# Patient Record
Sex: FEMALE | Race: BLACK OR AFRICAN AMERICAN | NOT HISPANIC OR LATINO
[De-identification: names, ages, dates, MRNs, and addresses within clinical notes are randomized per-mention and may not be internally consistent; named-entity substitution may affect disease eponyms.]

---

## 2017-03-20 ENCOUNTER — APPOINTMENT (OUTPATIENT)
Dept: CARDIOLOGY | Facility: CLINIC | Age: 52
End: 2017-03-20

## 2017-06-26 ENCOUNTER — APPOINTMENT (OUTPATIENT)
Dept: CARDIOLOGY | Facility: CLINIC | Age: 52
End: 2017-06-26

## 2017-06-27 ENCOUNTER — APPOINTMENT (OUTPATIENT)
Dept: CARDIOLOGY | Facility: CLINIC | Age: 52
End: 2017-06-27

## 2017-09-22 ENCOUNTER — APPOINTMENT (OUTPATIENT)
Dept: CARDIOLOGY | Facility: CLINIC | Age: 52
End: 2017-09-22
Payer: COMMERCIAL

## 2017-09-22 PROCEDURE — 93284 PRGRMG EVAL IMPLANTABLE DFB: CPT

## 2017-11-17 ENCOUNTER — RECORD ABSTRACTING (OUTPATIENT)
Age: 52
End: 2017-11-17

## 2017-11-21 ENCOUNTER — MEDICATION RENEWAL (OUTPATIENT)
Age: 52
End: 2017-11-21

## 2017-12-21 ENCOUNTER — APPOINTMENT (OUTPATIENT)
Dept: CARDIOLOGY | Facility: CLINIC | Age: 52
End: 2017-12-21
Payer: COMMERCIAL

## 2017-12-21 PROCEDURE — 93306 TTE W/DOPPLER COMPLETE: CPT

## 2017-12-21 PROCEDURE — 93880 EXTRACRANIAL BILAT STUDY: CPT

## 2017-12-28 DIAGNOSIS — Z78.9 OTHER SPECIFIED HEALTH STATUS: ICD-10-CM

## 2017-12-28 DIAGNOSIS — Z82.49 FAMILY HISTORY OF ISCHEMIC HEART DISEASE AND OTHER DISEASES OF THE CIRCULATORY SYSTEM: ICD-10-CM

## 2017-12-29 ENCOUNTER — APPOINTMENT (OUTPATIENT)
Dept: CARDIOLOGY | Facility: CLINIC | Age: 52
End: 2017-12-29
Payer: COMMERCIAL

## 2017-12-29 VITALS
HEIGHT: 66.5 IN | SYSTOLIC BLOOD PRESSURE: 130 MMHG | OXYGEN SATURATION: 99 % | HEART RATE: 87 BPM | DIASTOLIC BLOOD PRESSURE: 72 MMHG | BODY MASS INDEX: 29.06 KG/M2 | WEIGHT: 183 LBS

## 2017-12-29 PROCEDURE — 99214 OFFICE O/P EST MOD 30 MIN: CPT

## 2017-12-29 PROCEDURE — 93284 PRGRMG EVAL IMPLANTABLE DFB: CPT

## 2017-12-29 RX ORDER — ASPIRIN ENTERIC COATED TABLETS 81 MG 81 MG/1
81 TABLET, DELAYED RELEASE ORAL DAILY
Refills: 0 | Status: ACTIVE | COMMUNITY

## 2018-01-02 ENCOUNTER — APPOINTMENT (OUTPATIENT)
Dept: ELECTROPHYSIOLOGY | Facility: CLINIC | Age: 53
End: 2018-01-02
Payer: COMMERCIAL

## 2018-01-02 VITALS
OXYGEN SATURATION: 99 % | HEIGHT: 65.6 IN | WEIGHT: 183 LBS | BODY MASS INDEX: 29.76 KG/M2 | DIASTOLIC BLOOD PRESSURE: 60 MMHG | HEART RATE: 82 BPM | RESPIRATION RATE: 16 BRPM | SYSTOLIC BLOOD PRESSURE: 118 MMHG

## 2018-01-02 VITALS
HEIGHT: 66.5 IN | WEIGHT: 183 LBS | RESPIRATION RATE: 16 BRPM | OXYGEN SATURATION: 99 % | BODY MASS INDEX: 29.06 KG/M2 | HEART RATE: 82 BPM

## 2018-01-02 PROCEDURE — 99243 OFF/OP CNSLTJ NEW/EST LOW 30: CPT

## 2018-01-02 PROCEDURE — 93284 PRGRMG EVAL IMPLANTABLE DFB: CPT

## 2018-02-27 ENCOUNTER — MEDICATION RENEWAL (OUTPATIENT)
Age: 53
End: 2018-02-27

## 2018-03-02 ENCOUNTER — MEDICATION RENEWAL (OUTPATIENT)
Age: 53
End: 2018-03-02

## 2018-03-30 ENCOUNTER — APPOINTMENT (OUTPATIENT)
Dept: CARDIOLOGY | Facility: CLINIC | Age: 53
End: 2018-03-30

## 2018-04-02 ENCOUNTER — APPOINTMENT (OUTPATIENT)
Dept: ELECTROPHYSIOLOGY | Facility: CLINIC | Age: 53
End: 2018-04-02
Payer: COMMERCIAL

## 2018-04-02 PROCEDURE — 93296 REM INTERROG EVL PM/IDS: CPT

## 2018-04-02 PROCEDURE — 93295 DEV INTERROG REMOTE 1/2/MLT: CPT

## 2018-04-02 PROCEDURE — 93297 REM INTERROG DEV EVAL ICPMS: CPT

## 2018-05-18 ENCOUNTER — RECORD ABSTRACTING (OUTPATIENT)
Age: 53
End: 2018-05-18

## 2018-06-05 ENCOUNTER — APPOINTMENT (OUTPATIENT)
Dept: CARDIOLOGY | Facility: CLINIC | Age: 53
End: 2018-06-05
Payer: COMMERCIAL

## 2018-06-05 PROCEDURE — 93284 PRGRMG EVAL IMPLANTABLE DFB: CPT

## 2018-06-08 ENCOUNTER — APPOINTMENT (OUTPATIENT)
Dept: CARDIOLOGY | Facility: CLINIC | Age: 53
End: 2018-06-08
Payer: COMMERCIAL

## 2018-06-08 VITALS
BODY MASS INDEX: 29.99 KG/M2 | SYSTOLIC BLOOD PRESSURE: 140 MMHG | HEIGHT: 65 IN | HEART RATE: 71 BPM | WEIGHT: 180 LBS | DIASTOLIC BLOOD PRESSURE: 70 MMHG | OXYGEN SATURATION: 100 %

## 2018-06-08 PROCEDURE — 99214 OFFICE O/P EST MOD 30 MIN: CPT

## 2018-07-02 ENCOUNTER — APPOINTMENT (OUTPATIENT)
Dept: ELECTROPHYSIOLOGY | Facility: CLINIC | Age: 53
End: 2018-07-02
Payer: COMMERCIAL

## 2018-07-02 PROCEDURE — 93297 REM INTERROG DEV EVAL ICPMS: CPT

## 2018-07-02 PROCEDURE — 93295 DEV INTERROG REMOTE 1/2/MLT: CPT

## 2018-07-02 PROCEDURE — 93296 REM INTERROG EVL PM/IDS: CPT

## 2018-07-22 PROBLEM — Z78.9 ALCOHOL USE: Status: ACTIVE | Noted: 2017-12-28

## 2018-09-17 ENCOUNTER — APPOINTMENT (OUTPATIENT)
Dept: CARDIOLOGY | Facility: CLINIC | Age: 53
End: 2018-09-17
Payer: COMMERCIAL

## 2018-09-17 PROCEDURE — 93284 PRGRMG EVAL IMPLANTABLE DFB: CPT

## 2018-10-08 ENCOUNTER — APPOINTMENT (OUTPATIENT)
Dept: ELECTROPHYSIOLOGY | Facility: CLINIC | Age: 53
End: 2018-10-08
Payer: COMMERCIAL

## 2018-10-08 PROCEDURE — 93295 DEV INTERROG REMOTE 1/2/MLT: CPT

## 2018-10-08 PROCEDURE — 93296 REM INTERROG EVL PM/IDS: CPT

## 2018-12-14 ENCOUNTER — APPOINTMENT (OUTPATIENT)
Dept: CARDIOLOGY | Facility: CLINIC | Age: 53
End: 2018-12-14
Payer: COMMERCIAL

## 2018-12-14 ENCOUNTER — APPOINTMENT (OUTPATIENT)
Dept: CARDIOLOGY | Facility: CLINIC | Age: 53
End: 2018-12-14

## 2018-12-14 PROCEDURE — 93306 TTE W/DOPPLER COMPLETE: CPT

## 2018-12-14 PROCEDURE — 93880 EXTRACRANIAL BILAT STUDY: CPT

## 2018-12-14 PROCEDURE — 93284 PRGRMG EVAL IMPLANTABLE DFB: CPT

## 2018-12-14 NOTE — PROCEDURE
[CRT-D] : Cardiac resynchronization therapy defibrillator [DDD] : DDD [Voltage: ___ volts] : Voltage was [unfilled] volts [Threshold Testing Performed] : Threshold testing was performed [Lead Imp:  ___ohms] : lead impedance was [unfilled] ohms [Sensing Amplitude ___mv] : sensing amplitude was [unfilled] mv [___V @] : [unfilled] V [___ ms] : [unfilled] ms [de-identified] : [employer] [de-identified] : Martinez Saeed 3365-40Q CRT-D [de-identified] : [id number] [de-identified] : 12/2015 [de-identified] :  [de-identified] : Time 2.6 years [de-identified] : AP 10%,  95%\par \par Settings:\par Zones Vfib Rate 200 Therapy ATP x 1, 36J, 40J x 5\par \par Settings\par Ventricle RV Sensing Auto, Amplitude 3.0V, Duration 0.5ms, LV Ampltidue 3.0V, 0.5mS.\par Atrium Sensing Auto, Amplitude 2.5V, Duration 0.5ms\par \par AICD check in 3 months\par AICD is NOT part of battery advisory \par 0 Episodes

## 2019-01-08 ENCOUNTER — APPOINTMENT (OUTPATIENT)
Dept: ELECTROPHYSIOLOGY | Facility: CLINIC | Age: 54
End: 2019-01-08

## 2019-01-22 ENCOUNTER — RECORD ABSTRACTING (OUTPATIENT)
Age: 54
End: 2019-01-22

## 2019-01-25 ENCOUNTER — APPOINTMENT (OUTPATIENT)
Dept: CARDIOLOGY | Facility: CLINIC | Age: 54
End: 2019-01-25
Payer: COMMERCIAL

## 2019-01-25 VITALS
OXYGEN SATURATION: 99 % | DIASTOLIC BLOOD PRESSURE: 64 MMHG | SYSTOLIC BLOOD PRESSURE: 130 MMHG | WEIGHT: 180 LBS | HEIGHT: 65 IN | BODY MASS INDEX: 29.99 KG/M2 | HEART RATE: 86 BPM

## 2019-01-25 PROCEDURE — 99214 OFFICE O/P EST MOD 30 MIN: CPT

## 2019-01-25 PROCEDURE — 93000 ELECTROCARDIOGRAM COMPLETE: CPT

## 2019-02-22 ENCOUNTER — CLINICAL ADVICE (OUTPATIENT)
Age: 54
End: 2019-02-22

## 2019-02-25 ENCOUNTER — RX RENEWAL (OUTPATIENT)
Age: 54
End: 2019-02-25

## 2019-03-22 ENCOUNTER — APPOINTMENT (OUTPATIENT)
Dept: CARDIOLOGY | Facility: CLINIC | Age: 54
End: 2019-03-22
Payer: COMMERCIAL

## 2019-03-22 PROCEDURE — 93284 PRGRMG EVAL IMPLANTABLE DFB: CPT

## 2019-03-22 NOTE — PROCEDURE
[ICD] : Implantable cardioverter-defibrillator [DDD] : DDD [Voltage: ___ volts] : Voltage was [unfilled] volts [Threshold Testing Performed] : Threshold testing was performed [Lead Imp:  ___ohms] : lead impedance was [unfilled] ohms [Sensing Amplitude ___mv] : sensing amplitude was [unfilled] mv [___V @] : [unfilled] V [___ ms] : [unfilled] ms [de-identified] : [employer] [de-identified] : Martinez Saeed 3365-40Q CRT-D [de-identified] : [id number] [de-identified] : 12/2015 [de-identified] :  [de-identified] : Time 2.4 years [de-identified] : AP 10%,  97%\par \par Settings:\par Zones Vfib Rate 200 Therapy ATP x 1, 36J, 40J x 5\par \par Settings\par Ventricle RV Sensing Auto, Amplitude 3.0V, Duration 0.5ms, LV Ampltidue 2.75V, 0.5ms\par Atrium Sensing Auto, Amplitude 2.5V, Duration 0.5ms\par \par AICD check in 3 months\par AICD is NOT part of battery advisory \par No new recorded events\par \par \par Sincerely,\par \par Madeleine Aviles PA-C\par Supervising MD: Dr. Helena Dixon\par

## 2019-06-25 ENCOUNTER — APPOINTMENT (OUTPATIENT)
Dept: CARDIOLOGY | Facility: CLINIC | Age: 54
End: 2019-06-25

## 2019-06-27 ENCOUNTER — APPOINTMENT (OUTPATIENT)
Dept: CARDIOLOGY | Facility: CLINIC | Age: 54
End: 2019-06-27
Payer: COMMERCIAL

## 2019-06-27 PROCEDURE — 93283 PRGRMG EVAL IMPLANTABLE DFB: CPT

## 2019-06-27 NOTE — PROCEDURE
[ICD] : Implantable cardioverter-defibrillator [DDD] : DDD [Voltage: ___ volts] : Voltage was [unfilled] volts [Threshold Testing Performed] : Threshold testing was performed [Lead Imp:  ___ohms] : lead impedance was [unfilled] ohms [Sensing Amplitude ___mv] : sensing amplitude was [unfilled] mv [___V @] : [unfilled] V [___ ms] : [unfilled] ms [de-identified] : [employer] [de-identified] : 12/2015 [de-identified] : Martinez Saeed 3365-40Q CRT-D [de-identified] : [id number] [de-identified] : Time 2.2 years [de-identified] : AP 5.7%,  96%\par \par Settings:\par Zones Vfib Rate 200 Therapy ATP x 1, 36J, 40J x 5\par \par Settings\par Ventricle RV Sensing Auto, Amplitude 3.0V, Duration 0.5ms, LV Ampltidue 2.75V, 0.5ms\par Atrium Sensing Auto, Amplitude 2.5V, Duration 0.5ms\par \par AICD check in 3 months\par AICD is NOT part of battery advisory \par No new recorded events\par \par \par Sincerely,\par \par Madeleine Aviles PA-C\par Supervising MD: Dr. Helena Dixon\par  [de-identified] :

## 2019-06-28 ENCOUNTER — APPOINTMENT (OUTPATIENT)
Dept: CARDIOLOGY | Facility: CLINIC | Age: 54
End: 2019-06-28
Payer: COMMERCIAL

## 2019-06-28 ENCOUNTER — NON-APPOINTMENT (OUTPATIENT)
Age: 54
End: 2019-06-28

## 2019-06-28 VITALS
BODY MASS INDEX: 31.82 KG/M2 | OXYGEN SATURATION: 99 % | DIASTOLIC BLOOD PRESSURE: 70 MMHG | HEART RATE: 79 BPM | WEIGHT: 191 LBS | HEIGHT: 65 IN | SYSTOLIC BLOOD PRESSURE: 132 MMHG

## 2019-06-28 PROCEDURE — 93000 ELECTROCARDIOGRAM COMPLETE: CPT

## 2019-06-28 PROCEDURE — 99214 OFFICE O/P EST MOD 30 MIN: CPT

## 2019-06-28 NOTE — ASSESSMENT
[FreeTextEntry1] : Cardiomyopathy. Stable. No symptoms or signs of CHF. Patient is physically very active. She walks 3 miles 5 times a week. No symptoms of shortness of breath or chest pains. Her AICD was interrogated. No events. She will continue with medications. Low-sodium diet discussed with patient. X-ray was done in January following echocardiogram. No evidence of pleural effusion. Cardiac followup 6 months unless needed.

## 2019-06-28 NOTE — HISTORY OF PRESENT ILLNESS
[FreeTextEntry1] : The patient is presenting today for cardiac followup visit. The patient is 53 years old female with a history of cardiomyopathy. Patient has a history of diabetes and hypertension. Based on cardiac catheterization in the past no evidence of obstructive CAD. She has reduced ejection fraction. She is status post AICD. She reports no shocks. No significant signs or symptoms of fluid overload.

## 2019-09-30 ENCOUNTER — APPOINTMENT (OUTPATIENT)
Dept: CARDIOLOGY | Facility: CLINIC | Age: 54
End: 2019-09-30

## 2019-10-22 ENCOUNTER — APPOINTMENT (OUTPATIENT)
Dept: CARDIOLOGY | Facility: CLINIC | Age: 54
End: 2019-10-22
Payer: COMMERCIAL

## 2019-10-22 VITALS
BODY MASS INDEX: 27.32 KG/M2 | HEIGHT: 66 IN | DIASTOLIC BLOOD PRESSURE: 70 MMHG | SYSTOLIC BLOOD PRESSURE: 126 MMHG | HEART RATE: 58 BPM | WEIGHT: 170 LBS | OXYGEN SATURATION: 97 %

## 2019-10-22 DIAGNOSIS — Z86.79 PERSONAL HISTORY OF OTHER DISEASES OF THE CIRCULATORY SYSTEM: ICD-10-CM

## 2019-10-22 PROCEDURE — 99215 OFFICE O/P EST HI 40 MIN: CPT

## 2019-10-22 RX ORDER — SPIRONOLACTONE 25 MG/1
25 TABLET ORAL DAILY
Qty: 90 | Refills: 3 | Status: DISCONTINUED | COMMUNITY
End: 2019-10-22

## 2019-10-22 RX ORDER — METFORMIN HYDROCHLORIDE 500 MG/1
500 TABLET, COATED ORAL TWICE DAILY
Refills: 0 | Status: DISCONTINUED | COMMUNITY
End: 2019-10-22

## 2019-10-22 RX ORDER — METOPROLOL SUCCINATE 25 MG/1
25 TABLET, EXTENDED RELEASE ORAL DAILY
Qty: 90 | Refills: 1 | Status: DISCONTINUED | COMMUNITY
End: 2019-10-22

## 2019-10-22 RX ORDER — RAMIPRIL 2.5 MG/1
2.5 CAPSULE ORAL DAILY
Qty: 90 | Refills: 0 | Status: DISCONTINUED | COMMUNITY
End: 2019-10-22

## 2019-10-22 NOTE — REVIEW OF SYSTEMS
[Feeling Fatigued] : feeling fatigued [Loss Of Hearing] : hearing loss [Shortness Of Breath] : shortness of breath [Negative] : Endocrine

## 2019-10-22 NOTE — PHYSICAL EXAM
[Normal Conjunctiva] : the conjunctiva exhibited no abnormalities [Eyelids - No Xanthelasma] : the eyelids demonstrated no xanthelasmas [Normal Jugular Venous A Waves Present] : normal jugular venous A waves present [No Jugular Venous Whpiple A Waves] : no jugular venous whipple A waves [Normal Jugular Venous V Waves Present] : normal jugular venous V waves present [Respiration, Rhythm And Depth] : normal respiratory rhythm and effort [Auscultation Breath Sounds / Voice Sounds] : lungs were clear to auscultation bilaterally [Exaggerated Use Of Accessory Muscles For Inspiration] : no accessory muscle use [Heart Rate And Rhythm] : heart rate and rhythm were normal [Heart Sounds] : normal S1 and S2 [Abdomen Tenderness] : non-tender [Abdomen Soft] : soft [Murmurs] : no murmurs present [] : no hepato-splenomegaly [Abdomen Mass (___ Cm)] : no abdominal mass palpated

## 2019-10-22 NOTE — HISTORY OF PRESENT ILLNESS
[FreeTextEntry1] : The patient is presenting here today for hospital followup visit. Patient is 53 years old female with a history of nonischemic cardiomyopathy, status post AICD, hypertension, diabetes. She had recent admission to [facility] and [facility] with DKA and sepsis secondary to community-acquired pneumonia. She was found possible tricuspid vegetation on transthoracic echo. She was transferred to [facility] where she had AICD extraction and she was put on a life best. She was treated with antibiotics. 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 She was treated with high-dose steroids. VICKEY was planned to be repeated for a tricuspid vegetation.

## 2019-10-22 NOTE — ASSESSMENT
[FreeTextEntry1] : the patient is presenting here today for hospital followup visit. Patient is 53 years old female with a history of nonischemic cardiomyopathy, status post AICD, hypertension, diabetes. She had recent admission to [facility] and [facility] with DKA and sepsis secondary to community-acquired pneumonia. She was found possible tricuspid vegetation on transthoracic echo. She was transferred to [facility] where she had AICD extraction and she was put on a life best. She was treated with antibiotics. 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 She was treated with high-dose steroids. VICKEY was planned to be repeated for a tricuspid vegetation.\par Repeat a VICKEY a week ago did not change in terms of tricuspid valve vegetations. Patient is to follow up with her cardiologist at [facility] for AICD insertion. She is also followed by rheumatology for chemotherapy treatment of  vasculitis.\par I had long discussion with Dr Singh who coordinates treatment with team at [facility] and will follow up with patient and .\par I also had a long discussion with  to reassure that follow ups are arranged.

## 2019-12-13 ENCOUNTER — APPOINTMENT (OUTPATIENT)
Dept: CARDIOLOGY | Facility: CLINIC | Age: 54
End: 2019-12-13

## 2019-12-30 ENCOUNTER — TRANSCRIPTION ENCOUNTER (OUTPATIENT)
Age: 54
End: 2019-12-30

## 2019-12-30 ENCOUNTER — APPOINTMENT (OUTPATIENT)
Dept: OTOLARYNGOLOGY | Facility: CLINIC | Age: 54
End: 2019-12-30
Payer: COMMERCIAL

## 2019-12-30 VITALS — BODY MASS INDEX: 27.16 KG/M2 | HEIGHT: 66 IN | WEIGHT: 169 LBS

## 2019-12-30 PROCEDURE — 92588 EVOKED AUDITORY TST COMPLETE: CPT

## 2019-12-30 PROCEDURE — 92567 TYMPANOMETRY: CPT

## 2019-12-30 PROCEDURE — 99244 OFF/OP CNSLTJ NEW/EST MOD 40: CPT | Mod: 25

## 2019-12-30 PROCEDURE — 92557 COMPREHENSIVE HEARING TEST: CPT

## 2019-12-30 NOTE — HISTORY OF PRESENT ILLNESS
[de-identified] : 54F with bilateral severe-profound hearing loss since August 2019.  Was in a diabetic coma in August, woke up with profound hearing loss right ear, severe-profound left ear since onset.  Hearing loss is constant, nonfluctuating.  Wearing an amplifier with some benefit.  Has had diabetes since age 50.  Has cardiomyopathy (secondary to viral infection?), Had AICD placed 4 years ago, more recently removed due to concern for endocarditis/tricuspid valve vegetation.  Takes aspirin, no other blood thinners.  Prior VICKEY from October 2019 showed an EF of 25-30%.  Treated for vasculitis, on steroids for months, just stopped last week.

## 2019-12-30 NOTE — REVIEW OF SYSTEMS
[Seasonal Allergies] : seasonal allergies [Dizziness] : dizziness [Hearing Loss] : hearing loss [Vertigo] : vertigo [Negative] : Heme/Lymph [FreeTextEntry1] : passing out, fatigue

## 2019-12-30 NOTE — DATA REVIEWED
[de-identified] : I personally reviewed the patient's audiogram, which shows right profound hearing loss, left severe-profound hearing loss with 60% word discrim.\par  [de-identified] : I personally reviewed outside records and they are summarized as follows:  She underwent evaluation by Dr. Lema who recommended evaluation for possible cochlear implant.\par

## 2019-12-30 NOTE — CONSULT LETTER
[FreeTextEntry1] : Dear Arturo,\par \par Thank you very much for your consultation request regarding Hanane Mcintyre.  As you know, she is a very pleasant 54-year-old woman with a history of bilateral severe-profound hearing loss that developed after awakening from a diabetic coma in August 2019.  She also has multiple other medical comorbidities including cardiomyopathy and vasculitis, along with recent removal of an AICD secondary to endocarditis.  She also has had severe imbalance and dizziness since the onset of hearing loss, and currently uses a walker for ambulation.\par \par Her otoscopic exam today is unremarkable.  A hearing test shows a right-sided profound hearing loss with absent speech discrimination, while the left ear has a severe to profound hearing loss with 60% speech discrimination.\par \par I agree that audiometrically Hanane is likely to be a good candidate for a right-sided cochlear implant.  We discussed how a cochlear implant differs from a hearing aid as well as details of the surgery and aural rehabilitation process.  Unfortunately, due to her significant cardiac comorbidities, this poses a significant risk for general anesthesia.  Before considering a cochlear implant for the right ear, I would ask that her cardiologist ensures that she is medically optimized.  In the short term I do believe she could still benefit from a hearing aid in the left ear, and I also recommended that she begin vestibular therapy.  I plan to see her back in early February after her upcoming VICKEY and cardiology reassessment.\par \par Thank you once again for the opportunity to participate in your patient's care, and I will continue to keep you updated as to her progress.\par \par Best regards,\par \par Dorian Watkins MD\par Otology/Neurotology\par Department of Otolaryngology\par [facility] [FreeTextEntry2] : Arturo Lema MD

## 2020-02-13 ENCOUNTER — APPOINTMENT (OUTPATIENT)
Dept: PHARMACY | Facility: CLINIC | Age: 55
End: 2020-02-13

## 2020-02-26 ENCOUNTER — APPOINTMENT (OUTPATIENT)
Dept: OTOLARYNGOLOGY | Facility: CLINIC | Age: 55
End: 2020-02-26

## 2020-02-26 ENCOUNTER — APPOINTMENT (OUTPATIENT)
Dept: OTOLARYNGOLOGY | Facility: CLINIC | Age: 55
End: 2020-02-26
Payer: COMMERCIAL

## 2020-02-26 VITALS — HEIGHT: 66 IN | WEIGHT: 169 LBS | BODY MASS INDEX: 27.16 KG/M2

## 2020-02-26 PROCEDURE — 99214 OFFICE O/P EST MOD 30 MIN: CPT

## 2020-02-26 NOTE — DATA REVIEWED
[de-identified] : I personally reviewed MRI report, which shows no internal auditory canal or retrocochlear pathology bilaterally.\par

## 2020-02-26 NOTE — CONSULT LETTER
[FreeTextEntry1] : Dear Arturo,\par \par Hanane Mcintyre presents for followup for her bilateral sensorineural hearing loss.  Since her last visit, she has undergone both brain MRI which was normal, and also had her pacemaker replaced.  \par \par Her exam today is stable.  I again had a long discussion regarding cochlear implant surgery, which she would like to pursue for the right ear.  For the left ear she plans to use a hearing aid as it is still aidable.  I ordered a temporal bone CT and formal cochlear implant evaluation.  She did consult with her cardiologist regarding ochlear implant surgery, and most likely she will be able to undergo general anesthesia required for this procedure.  I will see her back once more after testing.\par \par Thank you once again for the opportunity to participate in your patient's care, and I will continue to keep you updated as to her progress.\par \par Best regards,\par \par Dorian Watkins MD\par Otology/Neurotology\par Department of Otolaryngology\par [facility] [FreeTextEntry2] : Arturo Lema MD

## 2020-03-10 ENCOUNTER — APPOINTMENT (OUTPATIENT)
Dept: PHARMACY | Facility: CLINIC | Age: 55
End: 2020-03-10
Payer: SELF-PAY

## 2020-03-10 PROCEDURE — V5010C: CUSTOM

## 2020-03-16 ENCOUNTER — APPOINTMENT (OUTPATIENT)
Dept: CT IMAGING | Facility: CLINIC | Age: 55
End: 2020-03-16
Payer: COMMERCIAL

## 2020-03-16 ENCOUNTER — APPOINTMENT (OUTPATIENT)
Dept: CT IMAGING | Facility: CLINIC | Age: 55
End: 2020-03-16

## 2020-03-16 PROCEDURE — 70480 CT ORBIT/EAR/FOSSA W/O DYE: CPT

## 2020-03-25 ENCOUNTER — APPOINTMENT (OUTPATIENT)
Dept: OTOLARYNGOLOGY | Facility: CLINIC | Age: 55
End: 2020-03-25
Payer: COMMERCIAL

## 2020-03-25 VITALS — BODY MASS INDEX: 27.16 KG/M2 | WEIGHT: 169 LBS | HEIGHT: 66 IN

## 2020-03-25 PROCEDURE — 99213 OFFICE O/P EST LOW 20 MIN: CPT

## 2020-03-25 NOTE — CONSULT LETTER
[FreeTextEntry2] : Arturo Lema MD [FreeTextEntry1] : Dear Arturo,\par \par Hanane Fontana presents for followup for her bilateral hearing loss.  Since her last visit, she has undergone temporal bone CT which showed normal anatomy for cochlear implant surgery.  Due to the recent COVID-19 outbreak, her cochlear implant evaluation was temporarily delayed.  We will also need to take into consideration the timing of her immunosuppressive therapy for her vasculitis.  For now I expect that we will be able to move forward with surgery sometime in the next 3 months.\par \par Thank you once again for the opportunity to participate in your patient's care, and I will continue to keep you updated as to her progress.\par \par Best regards,\par \par Dorian Watkins MD\par Otology/Neurotology\par Department of Otolaryngology\par [facility]

## 2020-03-25 NOTE — DATA REVIEWED
[de-identified] : I personally reviewed temporal bone CT images and the report, which shows no evidence of middle ear or mastoid opacification bilaterally.  The ossicular chain appears intact bilaterally.\par

## 2020-03-26 ENCOUNTER — APPOINTMENT (OUTPATIENT)
Dept: PHARMACY | Facility: CLINIC | Age: 55
End: 2020-03-26
Payer: SELF-PAY

## 2020-03-26 PROCEDURE — V5264A: CUSTOM | Mod: LT

## 2020-03-26 PROCEDURE — V5264D: CUSTOM | Mod: LT

## 2020-03-26 PROCEDURE — V5257E: CUSTOM | Mod: LT

## 2020-03-26 PROCEDURE — V5254D: CUSTOM

## 2020-03-30 ENCOUNTER — APPOINTMENT (OUTPATIENT)
Dept: SPEECH THERAPY | Facility: CLINIC | Age: 55
End: 2020-03-30

## 2020-05-28 ENCOUNTER — APPOINTMENT (OUTPATIENT)
Dept: PHARMACY | Facility: CLINIC | Age: 55
End: 2020-05-28
Payer: SELF-PAY

## 2020-05-28 PROCEDURE — V5299A: CUSTOM | Mod: NC

## 2020-06-11 ENCOUNTER — APPOINTMENT (OUTPATIENT)
Dept: CARDIOLOGY | Facility: CLINIC | Age: 55
End: 2020-06-11
Payer: COMMERCIAL

## 2020-06-11 PROCEDURE — 93282 PRGRMG EVAL IMPLANTABLE DFB: CPT

## 2020-06-11 NOTE — PROCEDURE
[No] : not [ICD] : Implantable cardioverter-defibrillator [VVI] : VVI [Lead Imp:  ___ohms] : lead impedance was [unfilled] ohms [Sensing Amplitude ___mv] : sensing amplitude was [unfilled] mv [___V @] : [unfilled] V [___ ms] : [unfilled] ms [de-identified] : Fortify Assura [de-identified] : MANNY [de-identified] : [id number] [de-identified] : 2/13/2020 [de-identified] : 40 [de-identified] : 9 years [de-identified] : \par \par AICD with normal sensing, capture, battery\par Upcoming office visit with Dr. Lopez\par \par Red flag symptoms which would warrant sooner emergent evaluation reviewed with the patient. \par Questions and concerns were addressed and answered. \par \par AICD interrogation in 3 months\par \par Sincerely,\par \par Madeleine Aviles PA-C\par  supervising MD: Dr. Callie Lopez

## 2020-06-25 ENCOUNTER — APPOINTMENT (OUTPATIENT)
Dept: SPEECH THERAPY | Facility: CLINIC | Age: 55
End: 2020-06-25

## 2020-06-25 ENCOUNTER — OUTPATIENT (OUTPATIENT)
Dept: OUTPATIENT SERVICES | Facility: HOSPITAL | Age: 55
LOS: 1 days | Discharge: HOME | End: 2020-06-25

## 2020-06-29 ENCOUNTER — APPOINTMENT (OUTPATIENT)
Dept: OTOLARYNGOLOGY | Facility: CLINIC | Age: 55
End: 2020-06-29
Payer: COMMERCIAL

## 2020-06-29 PROCEDURE — 99441: CPT

## 2020-07-27 DIAGNOSIS — H90.3 SENSORINEURAL HEARING LOSS, BILATERAL: ICD-10-CM

## 2020-08-12 ENCOUNTER — APPOINTMENT (OUTPATIENT)
Dept: CARDIOLOGY | Facility: CLINIC | Age: 55
End: 2020-08-12
Payer: COMMERCIAL

## 2020-08-12 ENCOUNTER — NON-APPOINTMENT (OUTPATIENT)
Age: 55
End: 2020-08-12

## 2020-08-12 VITALS
TEMPERATURE: 97.1 F | BODY MASS INDEX: 25.71 KG/M2 | SYSTOLIC BLOOD PRESSURE: 112 MMHG | WEIGHT: 160 LBS | HEIGHT: 66 IN | OXYGEN SATURATION: 98 % | DIASTOLIC BLOOD PRESSURE: 64 MMHG | HEART RATE: 70 BPM

## 2020-08-12 PROCEDURE — 93000 ELECTROCARDIOGRAM COMPLETE: CPT

## 2020-08-12 PROCEDURE — 99215 OFFICE O/P EST HI 40 MIN: CPT

## 2020-08-12 NOTE — REVIEW OF SYSTEMS
[Feeling Fatigued] : feeling fatigued [Loss Of Hearing] : hearing loss [Shortness Of Breath] : shortness of breath [Chest  Pressure] : no chest pressure [Dyspnea on exertion] : dyspnea during exertion [Lower Ext Edema] : no extremity edema [Chest Pain] : no chest pain [Leg Claudication] : no intermittent leg claudication [Palpitations] : no palpitations [see HPI] : see HPI [Tremor] : no tremor was seen [Dizziness] : dizziness [Numbness (Hypesthesia)] : no numbness [Convulsions] : no convulsions [Tingling (Paresthesia)] : no tingling [Negative] : Heme/Lymph

## 2020-08-12 NOTE — REASON FOR VISIT
[Cardiomyopathy] : cardiomyopathy [Follow-Up - Clinic] : a clinic follow-up of [Heart Failure] : congestive heart failure [Hyperlipidemia] : hyperlipidemia [Family Member] : family member [FreeTextEntry1] : 54-year-old female comes in for follow-up consultation in the office.  Since last seen she has been doing fairly well except complain of fatigue intermittent.  Mild postural dizziness.  Exertional dyspnea without PND orthopnea pedal edema.  No chest pain.  No significant palpitation.  No AICD discharge.\par Stable weight and diet.\par Activity level is limited.\par Compliant with medication noted.\par No claudication pain.\par

## 2020-08-12 NOTE — ASSESSMENT
[FreeTextEntry1] : Reviewed on August 12, 2020.\par Labs from 5/18/2020.  Creatinine 1.15 sodium 144 potassium 4.3 LFT normal.  Serum ferritin level 595.  Hemoglobin 11.1.

## 2020-08-12 NOTE — DISCUSSION/SUMMARY
[FreeTextEntry1] : 54-year-old female with above medical history and active medical problems as noted below.\par #1.  Chronic systolic heart failure because of nonischemic nondilated cardiomyopathy at present class II heart failure symptoms without signs of volume overload.  Not on ACE inhibitor/angiotensin receptor blocker or spironolactone.\par Continue metoprolol to be taken at nighttime\par Decrease furosemide to 20 mg every other day\par Decrease potassium to half a tablet daily every other day\par After reviewing risk benefits alternative of use of Entresto 24/26 twice daily has been started.  Any significant side effects they will contact me.\par She will have BMP checked weekly for next 3 to 4 weeks.\par If unable to tolerate we will consider changing it to lower dose of ACE inhibitor or angiotensin receptor blocker.\par As needed we can discontinue the use of furosemide and potassium as needed.\par Low-salt diet.  Close follow-up on weight strongly recommended.\par 2.  Labs which will include BNP level\par 3.  AICD [employer] single lead follow on a regular basis\par 4.  History of tricuspid valve vegetation.  Follow-up echocardiogram for cardiomyopathy tricuspid valve vegetation.  Last C-reactive protein was 3.6 and sed rate was 6 in May 2020.\par She has no fever or chills no element of distal embolization at present.\par 5.  Mild postural dizziness.  Probably in relation to diuretic use.  Possible playing a role would be auditory dysfunction.\par Increase fluid intake.  Decreasing dose of furosemide.  And other changes in medication\par 6.  Hyperlipidemia.  Continue present regimen of medications.  Follow-up labs\par 7.  Essential hypertension at present stable no significant postural shifts.  We will continue to follow.\par \par Counseling regarding low saturated fat, salt and carbohydrate intake was reviewed. Active lifestyle and regular. Exercise along with weight management is advised.\par All the above were at length reviewed. Answered all the questions. Thank you very much for this kind referral. Please do not hesitate to give me a call for any question.\par Part of this transcription was done with voice recognition software and phonetically similar errors are common. I apologize for that. Please do not hesitate to call for any questions due to above.\par

## 2020-08-12 NOTE — PHYSICAL EXAM
[Eyelids - No Xanthelasma] : the eyelids demonstrated no xanthelasmas [Normal Conjunctiva] : the conjunctiva exhibited no abnormalities [Normal Jugular Venous A Waves Present] : normal jugular venous A waves present [Normal Jugular Venous V Waves Present] : normal jugular venous V waves present [No Jugular Venous Whipple A Waves] : no jugular venous whipple A waves [Auscultation Breath Sounds / Voice Sounds] : lungs were clear to auscultation bilaterally [Exaggerated Use Of Accessory Muscles For Inspiration] : no accessory muscle use [Respiration, Rhythm And Depth] : normal respiratory rhythm and effort [Heart Rate And Rhythm] : heart rate and rhythm were normal [Heart Sounds] : normal S1 and S2 [Abdomen Soft] : soft [Abdomen Tenderness] : non-tender [Murmurs] : no murmurs present [Abdomen Mass (___ Cm)] : no abdominal mass palpated [Abnormal Walk] : normal gait [Gait - Sufficient For Exercise Testing] : the gait was sufficient for exercise testing [Nail Clubbing] : no clubbing of the fingernails [Petechial Hemorrhages (___cm)] : no petechial hemorrhages [Cyanosis, Localized] : no localized cyanosis [Skin Color & Pigmentation] : normal skin color and pigmentation [] : no rash [No Venous Stasis] : no venous stasis [Skin Lesions] : no skin lesions [No Skin Ulcers] : no skin ulcer [No Xanthoma] : no  xanthoma was observed [Oriented To Time, Place, And Person] : oriented to person, place, and time [Affect] : the affect was normal [No Anxiety] : not feeling anxious [Mood] : the mood was normal

## 2020-08-12 NOTE — HISTORY OF PRESENT ILLNESS
[FreeTextEntry1] : 1.  Nonischemic non-dilated cardiomyopathy.\par 2.  [employer] single lead AICD for primary prevention\par 3.  Essential hypertension.  CHF.  Mild CKD.  Non-smoker\par 4.  Type 2 diabetes mellitus.\par 5.  Hyperlipidemia on statin therapy\par 6.  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  History of vasculitis and acute hearing loss\par 8.  Mitral and tricuspid regurgitation.

## 2020-08-18 RX ORDER — FUROSEMIDE 40 MG/1
40 TABLET ORAL EVERY OTHER DAY
Refills: 0 | Status: DISCONTINUED | COMMUNITY
End: 2020-08-18

## 2020-09-09 ENCOUNTER — APPOINTMENT (OUTPATIENT)
Dept: CARDIOLOGY | Facility: CLINIC | Age: 55
End: 2020-09-09
Payer: COMMERCIAL

## 2020-09-09 ENCOUNTER — NON-APPOINTMENT (OUTPATIENT)
Age: 55
End: 2020-09-09

## 2020-09-09 VITALS
WEIGHT: 154 LBS | DIASTOLIC BLOOD PRESSURE: 60 MMHG | OXYGEN SATURATION: 98 % | HEIGHT: 66 IN | BODY MASS INDEX: 24.75 KG/M2 | HEART RATE: 76 BPM | SYSTOLIC BLOOD PRESSURE: 104 MMHG

## 2020-09-09 DIAGNOSIS — I33.0 ACUTE AND SUBACUTE INFECTIVE ENDOCARDITIS: ICD-10-CM

## 2020-09-09 DIAGNOSIS — Z00.00 ENCOUNTER FOR GENERAL ADULT MEDICAL EXAMINATION W/OUT ABNORMAL FINDINGS: ICD-10-CM

## 2020-09-09 PROCEDURE — 93000 ELECTROCARDIOGRAM COMPLETE: CPT | Mod: 59

## 2020-09-09 PROCEDURE — 99214 OFFICE O/P EST MOD 30 MIN: CPT

## 2020-09-09 PROCEDURE — 93282 PRGRMG EVAL IMPLANTABLE DFB: CPT

## 2020-09-09 PROCEDURE — 93306 TTE W/DOPPLER COMPLETE: CPT

## 2020-09-09 NOTE — PROCEDURE
[See Device Printout] : See device printout [No] : not [VVI] : VVI [ICD] : Implantable cardioverter-defibrillator [Lead Imp:  ___ohms] : lead impedance was [unfilled] ohms [Sensing Amplitude ___mv] : sensing amplitude was [unfilled] mv [___ ms] : [unfilled] ms [None] : none [___V @] : [unfilled] V [Counters Reset] : the counters were reset [Pace ___ %] : Pace [unfilled]% [de-identified] : MANNY [de-identified] : [id number] [de-identified] : Fortify Assura [de-identified] : 40 [de-identified] : 8.8 years [de-identified] : 2/13/2020 [de-identified] : \par \par AICD with normal sensing, capture, battery\par No events\par Office visit today\par \par Sincerely,\par \par GERBER Chacko\par Supervising MD: Dr. Callie Lopez

## 2020-09-18 ENCOUNTER — OUTPATIENT (OUTPATIENT)
Dept: OUTPATIENT SERVICES | Facility: HOSPITAL | Age: 55
LOS: 1 days | End: 2020-09-18

## 2020-09-18 VITALS
RESPIRATION RATE: 16 BRPM | OXYGEN SATURATION: 98 % | TEMPERATURE: 98 F | HEART RATE: 74 BPM | SYSTOLIC BLOOD PRESSURE: 112 MMHG | WEIGHT: 156.09 LBS | HEIGHT: 66 IN | DIASTOLIC BLOOD PRESSURE: 64 MMHG

## 2020-09-18 DIAGNOSIS — H90.3 SENSORINEURAL HEARING LOSS, BILATERAL: ICD-10-CM

## 2020-09-18 DIAGNOSIS — E11.9 TYPE 2 DIABETES MELLITUS WITHOUT COMPLICATIONS: ICD-10-CM

## 2020-09-18 DIAGNOSIS — Z95.810 PRESENCE OF AUTOMATIC (IMPLANTABLE) CARDIAC DEFIBRILLATOR: ICD-10-CM

## 2020-09-18 DIAGNOSIS — H90.5 UNSPECIFIED SENSORINEURAL HEARING LOSS: ICD-10-CM

## 2020-09-18 DIAGNOSIS — Z95.810 PRESENCE OF AUTOMATIC (IMPLANTABLE) CARDIAC DEFIBRILLATOR: Chronic | ICD-10-CM

## 2020-09-18 DIAGNOSIS — Z86.79 PERSONAL HISTORY OF OTHER DISEASES OF THE CIRCULATORY SYSTEM: ICD-10-CM

## 2020-09-18 DIAGNOSIS — Z98.51 TUBAL LIGATION STATUS: Chronic | ICD-10-CM

## 2020-09-18 DIAGNOSIS — I10 ESSENTIAL (PRIMARY) HYPERTENSION: ICD-10-CM

## 2020-09-18 LAB
ANION GAP SERPL CALC-SCNC: 11 MMO/L — SIGNIFICANT CHANGE UP (ref 7–14)
BUN SERPL-MCNC: 21 MG/DL — SIGNIFICANT CHANGE UP (ref 7–23)
CALCIUM SERPL-MCNC: 9.8 MG/DL — SIGNIFICANT CHANGE UP (ref 8.4–10.5)
CHLORIDE SERPL-SCNC: 106 MMOL/L — SIGNIFICANT CHANGE UP (ref 98–107)
CO2 SERPL-SCNC: 25 MMOL/L — SIGNIFICANT CHANGE UP (ref 22–31)
CREAT SERPL-MCNC: 0.83 MG/DL — SIGNIFICANT CHANGE UP (ref 0.5–1.3)
GLUCOSE SERPL-MCNC: 102 MG/DL — HIGH (ref 70–99)
HBA1C BLD-MCNC: 6 % — HIGH (ref 4–5.6)
HCG UR-SCNC: NEGATIVE — SIGNIFICANT CHANGE UP
HCT VFR BLD CALC: 37.8 % — SIGNIFICANT CHANGE UP (ref 34.5–45)
HGB BLD-MCNC: 11.2 G/DL — LOW (ref 11.5–15.5)
MCHC RBC-ENTMCNC: 21.5 PG — LOW (ref 27–34)
MCHC RBC-ENTMCNC: 29.6 % — LOW (ref 32–36)
MCV RBC AUTO: 72.4 FL — LOW (ref 80–100)
NRBC # FLD: 0 K/UL — SIGNIFICANT CHANGE UP (ref 0–0)
PLATELET # BLD AUTO: 224 K/UL — SIGNIFICANT CHANGE UP (ref 150–400)
PMV BLD: 10.9 FL — SIGNIFICANT CHANGE UP (ref 7–13)
POTASSIUM SERPL-MCNC: 4.7 MMOL/L — SIGNIFICANT CHANGE UP (ref 3.5–5.3)
POTASSIUM SERPL-SCNC: 4.7 MMOL/L — SIGNIFICANT CHANGE UP (ref 3.5–5.3)
RBC # BLD: 5.22 M/UL — HIGH (ref 3.8–5.2)
RBC # FLD: 16.8 % — HIGH (ref 10.3–14.5)
SODIUM SERPL-SCNC: 142 MMOL/L — SIGNIFICANT CHANGE UP (ref 135–145)
SP GR UR: 1.03 — SIGNIFICANT CHANGE UP (ref 1–1.04)
WBC # BLD: 4.58 K/UL — SIGNIFICANT CHANGE UP (ref 3.8–10.5)
WBC # FLD AUTO: 4.58 K/UL — SIGNIFICANT CHANGE UP (ref 3.8–10.5)

## 2020-09-18 RX ORDER — SODIUM CHLORIDE 9 MG/ML
1000 INJECTION, SOLUTION INTRAVENOUS
Refills: 0 | Status: DISCONTINUED | OUTPATIENT
Start: 2020-10-12 | End: 2020-10-26

## 2020-09-18 RX ORDER — METOPROLOL TARTRATE 50 MG
1 TABLET ORAL
Qty: 0 | Refills: 0 | DISCHARGE

## 2020-09-18 RX ORDER — SODIUM CHLORIDE 9 MG/ML
3 INJECTION INTRAMUSCULAR; INTRAVENOUS; SUBCUTANEOUS ONCE
Refills: 0 | Status: DISCONTINUED | OUTPATIENT
Start: 2020-10-12 | End: 2020-10-26

## 2020-09-18 NOTE — H&P PST ADULT - NS MD HP INPLANTS MED DEV
Hearing aid/Automatic Implantable Cardioverter Defibrillator/[employer] AICD Model [id number] Serial # [id number] Implant date 2/2020

## 2020-09-18 NOTE — H&P PST ADULT - NSANTHOSAYNRD_GEN_A_CORE
No. SMILEY screening performed.  STOP BANG Legend: 0-2 = LOW Risk; 3-4 = INTERMEDIATE Risk; 5-8 = HIGH Risk

## 2020-09-18 NOTE — H&P PST ADULT - NSICDXPASTMEDICALHX_GEN_ALL_CORE_FT
PAST MEDICAL HISTORY:  Acute hearing loss     AICD (automatic cardioverter/defibrillator) present     Cardiomyopathy nonischemic non-dilated cardiomyopathy    Chronic CHF     Chronic kidney disease (CKD) mild    Endocarditis of tricuspid valve     Frozen shoulder right    H/O bacterial endocarditis     History of coma 8/2019 for possible infection of AICD. s/p removal of device    History of vasculitis     HLD (hyperlipidemia)     HTN (hypertension)     Mitral regurgitation     Systolic CHF, chronic     Tricuspid regurgitation     Type 2 diabetes mellitus on insulin

## 2020-09-18 NOTE — H&P PST ADULT - ENDOCRINE COMMENTS
type 2 DM on insulin. avg fasting BS 89-90mg/dl. on insulin type 2 DM on insulin. avg fasting BS 89-90mg/dl. on lantus at bedtime

## 2020-09-18 NOTE — H&P PST ADULT - HEARING LOSS
R/right ear hearing loss. pt wears left ear hearing aid R/hearing loss, bilateral. R<L. pt wears left ear hearing aid

## 2020-09-18 NOTE — H&P PST ADULT - NEGATIVE ENMT SYMPTOMS
no gum bleeding/no nose bleeds/no dry mouth/no ear pain/pt reports "off" balance/no vertigo/no dysphagia

## 2020-09-18 NOTE — H&P PST ADULT - NSICDXPROBLEM_GEN_ALL_CORE_FT
PROBLEM DIAGNOSES  Problem: Sensorineural hearing loss  Assessment and Plan: preop for right ear cochlear implant, facial nerve monitoring on 9/22/20  preop instructions given, pt verbalized understanding   GI prophylaxis provided   pt scheduled for COVID testing on 9/19/20    Problem: History of CHF (congestive heart failure)  Assessment and Plan: pt will take Entresto day of surgery with sips of water   Cardiac clearance, ECHO and EKG on chart     Problem: Type 2 diabetes mellitus  Assessment and Plan: pt will hold tradjenta day of surgery.   pt will reduce Lantus by 20% night before surgery. Only 12 units to be given   accu check to be assessed on admission     Problem: AICD (automatic cardioverter/defibrillator) present  Assessment and Plan: pt with [employer] AICD Model # [id number] Serial [id number] implant date 2/13/20  Discussed case with [facility] anesthesiologist Dr. Martínez- case has to be moved to the main OR due to pt's AICD status. Dr. Watkins's surgical coordinator Margret made aware. pt and daughter also informed of change   OR booking notified of AICD status via fax

## 2020-09-18 NOTE — H&P PST ADULT - NSICDXPASTSURGICALHX_GEN_ALL_CORE_FT
PAST SURGICAL HISTORY:  H/O tubal ligation     S/P implantation of automatic cardioverter/defibrillator (AICD) [employer] AICD insertion 2015. s/p removal 8/2019. AICD replaced 2/2020

## 2020-09-19 ENCOUNTER — APPOINTMENT (OUTPATIENT)
Dept: DISASTER EMERGENCY | Facility: CLINIC | Age: 55
End: 2020-09-19

## 2020-09-20 LAB — SARS-COV-2 N GENE NPH QL NAA+PROBE: NOT DETECTED

## 2020-09-25 NOTE — REVIEW OF SYSTEMS
[Loss Of Hearing] : hearing loss [see HPI] : see HPI [Dizziness] : dizziness [Negative] : Psychiatric [Dyspnea on exertion] : not dyspnea during exertion [Chest  Pressure] : no chest pressure [Chest Pain] : no chest pain [Lower Ext Edema] : no extremity edema [Leg Claudication] : no intermittent leg claudication [Palpitations] : no palpitations [Tremor] : no tremor was seen [Numbness (Hypesthesia)] : no numbness [Convulsions] : no convulsions [Tingling (Paresthesia)] : no tingling

## 2020-09-25 NOTE — PHYSICAL EXAM
[Normal Appearance] : normal appearance [General Appearance - Well Developed] : well developed [Well Groomed] : well groomed [General Appearance - Well Nourished] : well nourished [No Deformities] : no deformities [General Appearance - In No Acute Distress] : no acute distress [Exaggerated Use Of Accessory Muscles For Inspiration] : no accessory muscle use [Respiration, Rhythm And Depth] : normal respiratory rhythm and effort [Heart Rate And Rhythm] : heart rate and rhythm were normal [Heart Sounds] : normal S1 and S2 [Auscultation Breath Sounds / Voice Sounds] : lungs were clear to auscultation bilaterally [Abdomen Tenderness] : non-tender [Murmurs] : no murmurs present [Abdomen Soft] : soft [Edema] : no peripheral edema present [Abdomen Mass (___ Cm)] : no abdominal mass palpated [Nail Clubbing] : no clubbing of the fingernails [Cyanosis, Localized] : no localized cyanosis [Skin Color & Pigmentation] : normal skin color and pigmentation [Petechial Hemorrhages (___cm)] : no petechial hemorrhages [] : no rash [No Venous Stasis] : no venous stasis [Skin Lesions] : no skin lesions [No Skin Ulcers] : no skin ulcer [No Xanthoma] : no  xanthoma was observed [Oriented To Time, Place, And Person] : oriented to person, place, and time [Affect] : the affect was normal [Mood] : the mood was normal [No Anxiety] : not feeling anxious [FreeTextEntry1] : slow gait with cane

## 2020-09-25 NOTE — ADDENDUM
[FreeTextEntry1] : \par Addendum 9/25/20:\par Pt's cochlear implant procedure was changed to 10/12/20. Pt remains optimized from cardiovascular standpoint to proceed as planned. Additionally would recommend *SBE ppx* given history of IE. Pt has allergy to ceftriaxone so given potential for cross sensitivity to pcn will rx clinda 600mg 1hr prior to procedure as per current guidelines. Pt and  notified and verbalize understanding of instructions. \par

## 2020-09-25 NOTE — HISTORY OF PRESENT ILLNESS
[Preoperative Visit] : for a medical evaluation prior to surgery [Scheduled Procedure ___] : a [unfilled] [Date of Surgery ___] : on [unfilled] [Surgeon Name ___] : surgeon: [unfilled] [Stable] : Stable [FreeTextEntry1] : \par Hanane is a pleasant 53 y/o female who presents today for device interrogation, echocardiogram, and requesting preop cardiac clearance for cochlear implant. \par \par Detailed history includes:\par 1.  Nonischemic non-dilated cardiomyopathy.\par 2.  [employer] single lead AICD for primary prevention\par 3.  Essential hypertension.  CHF.  Mild CKD.  Non-smoker\par 4.  Type 2 diabetes mellitus.\par 5.  Hyperlipidemia on statin therapy\par 6.  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  History of vasculitis and acute hearing loss\par 8.  Mitral and tricuspid regurgitation.\par \par There has been no recent illness or hospitalization. Recently started on low dose entresto and taken off lasix. She has remained stable from cardiac standpoint without evidence of volume overload. Renal function and electrolytes are stable per recent labs. She was dizzy at initiation of txt which resolved after a few weeks. Reports stable functional status. Denies exertional chest pain or discomfort. Denies unusual shortness of breath, orthopnea, weight gain, or LE edema. Denies palpitations or syncope. \par \par EKG today shows NSR with chronic LBBB\par Echo today shows LVEF 20-25%, global LV dysfunction. Borderline PH. Mild VHD. Overall no sig change. \par \par Labs 9/1/20 k 4.4, creat 0.83, BNP 91

## 2020-09-25 NOTE — DISCUSSION/SUMMARY
[FreeTextEntry1] : ALEKSANDR MARTEL is a 54 year old F who presents today Sep 09, 2020 with the above history and the following active issues: \par \par #1.  Chronic systolic heart failure because of nonischemic nondilated cardiomyopathy at present class II heart failure symptoms without signs of volume overload. BNP wnl. LVEF remains 20-25% with ICD. \par Continue metoprolol to be taken at nighttime.\par Had dizziness on lasix and Entresto 24/26 twice daily so lasix was discontinued. \par Would recommend PRN use for any fluid OL.\par Renal function and electrolytes are stable per recent labs. BP stable. \par Continue entresto. Call for any further dizziness. \par If unable to tolerate we will consider changing it to lower dose of ACE inhibitor or angiotensin receptor blocker. Unable to add spironolactone at this time d/t low BP/dizziness. \par Low-salt diet.  Close follow-up on weight strongly recommended.\par Pt and  agreeable for consultation with HF specialist, will refer to Dr. Monroy in [place]. \par \par 2.   AICD [employer] single lead shows no events, stable battery and lead measurements. Continue to follow q 3 mo. \par \par 3.  History of tricuspid valve vegetation. Echo shows stable findings. Last C-reactive protein was 3.6 and sed rate was 6 in May 2020. She has no fever or chills no element of distal embolization at present. Continue surveillance. \par \par 4.  Hyperlipidemia.  Continue present regimen of medications.  Follow-up lipid panel in the future. \par \par 5.  Essential hypertension at present stable no significant postural shifts.  We will continue to follow.\par \par 6. Preop cardiac clearance, cochlear implant - \par At present chronic cardiac conditions noted above are stable. \par Baseline functional status is acceptable.    \par The clinical benefit of the proposed procedure outweighs the associated cardiovascular risk.  \par Risk not attenuated with further CV testing.  \par Prior testing as outlined above.\par Optimized from a cardiovascular perspective to proceed.\par Minimize time off ASA, may hold 5-7 days prior\par Continue beta blocker and entresto\par ICD management as per routine valente-op/ guidelines\par DVT ppx\par Even fluid balance with h/o CHF \par \par Please do not hesitate to call for any questions or concerns. \par \par Sincerely,\par \par GERBER Chacko\par Patients history, testing, and plan reviewed with supervising MD: Dr. Callie Lopez

## 2020-09-26 ENCOUNTER — APPOINTMENT (OUTPATIENT)
Dept: DISASTER EMERGENCY | Facility: CLINIC | Age: 55
End: 2020-09-26

## 2020-09-29 PROBLEM — E11.9 TYPE 2 DIABETES MELLITUS WITHOUT COMPLICATIONS: Chronic | Status: ACTIVE | Noted: 2020-09-18

## 2020-09-29 PROBLEM — Z86.79 PERSONAL HISTORY OF OTHER DISEASES OF THE CIRCULATORY SYSTEM: Chronic | Status: ACTIVE | Noted: 2020-09-18

## 2020-09-29 PROBLEM — I07.9 RHEUMATIC TRICUSPID VALVE DISEASE, UNSPECIFIED: Chronic | Status: ACTIVE | Noted: 2020-09-18

## 2020-09-29 PROBLEM — N18.9 CHRONIC KIDNEY DISEASE, UNSPECIFIED: Chronic | Status: ACTIVE | Noted: 2020-09-18

## 2020-09-29 PROBLEM — I07.1 RHEUMATIC TRICUSPID INSUFFICIENCY: Chronic | Status: ACTIVE | Noted: 2020-09-18

## 2020-09-29 PROBLEM — E78.5 HYPERLIPIDEMIA, UNSPECIFIED: Chronic | Status: ACTIVE | Noted: 2020-09-18

## 2020-09-29 PROBLEM — H91.90 UNSPECIFIED HEARING LOSS, UNSPECIFIED EAR: Chronic | Status: ACTIVE | Noted: 2020-09-18

## 2020-09-29 PROBLEM — Z87.898 PERSONAL HISTORY OF OTHER SPECIFIED CONDITIONS: Chronic | Status: ACTIVE | Noted: 2020-09-18

## 2020-09-29 PROBLEM — I50.9 HEART FAILURE, UNSPECIFIED: Chronic | Status: ACTIVE | Noted: 2020-09-18

## 2020-09-29 PROBLEM — I50.22 CHRONIC SYSTOLIC (CONGESTIVE) HEART FAILURE: Chronic | Status: ACTIVE | Noted: 2020-09-18

## 2020-09-29 PROBLEM — I34.0 NONRHEUMATIC MITRAL (VALVE) INSUFFICIENCY: Chronic | Status: ACTIVE | Noted: 2020-09-18

## 2020-09-29 PROBLEM — I42.9 CARDIOMYOPATHY, UNSPECIFIED: Chronic | Status: ACTIVE | Noted: 2020-09-18

## 2020-09-29 PROBLEM — I10 ESSENTIAL (PRIMARY) HYPERTENSION: Chronic | Status: ACTIVE | Noted: 2020-09-18

## 2020-09-29 PROBLEM — M75.00 ADHESIVE CAPSULITIS OF UNSPECIFIED SHOULDER: Chronic | Status: ACTIVE | Noted: 2020-09-18

## 2020-10-09 ENCOUNTER — APPOINTMENT (OUTPATIENT)
Dept: DISASTER EMERGENCY | Facility: CLINIC | Age: 55
End: 2020-10-09

## 2020-10-09 DIAGNOSIS — Z01.818 ENCOUNTER FOR OTHER PREPROCEDURAL EXAMINATION: ICD-10-CM

## 2020-10-09 NOTE — ASU PATIENT PROFILE, ADULT - PMH
Acute hearing loss    AICD (automatic cardioverter/defibrillator) present    Cardiomyopathy  nonischemic non-dilated cardiomyopathy  Chronic CHF    Chronic kidney disease (CKD)  mild  Endocarditis of tricuspid valve    Frozen shoulder  right  H/O bacterial endocarditis    History of coma  8/2019 for possible infection of AICD. s/p removal of device  History of vasculitis    HLD (hyperlipidemia)    HTN (hypertension)    Mitral regurgitation    Systolic CHF, chronic    Tricuspid regurgitation    Type 2 diabetes mellitus  on insulin   Acute hearing loss    AICD (automatic cardioverter/defibrillator) present  Pacemaker  Cardiomyopathy  nonischemic non-dilated cardiomyopathy  Chronic CHF    Chronic kidney disease (CKD)  mild  Endocarditis of tricuspid valve    Frozen shoulder  right  H/O bacterial endocarditis    History of coma  8/2019 for possible infection of AICD. s/p removal of device  History of vasculitis    HLD (hyperlipidemia)    HTN (hypertension)    Mitral regurgitation    Systolic CHF, chronic    Tricuspid regurgitation    Type 2 diabetes mellitus  on insulin

## 2020-10-09 NOTE — ASU PATIENT PROFILE, ADULT - PSH
H/O tubal ligation    S/P implantation of automatic cardioverter/defibrillator (AICD)  [employer] AICD insertion 2015. s/p removal 8/2019. AICD replaced 2/2020   H/O lipoma    H/O tubal ligation    S/P implantation of automatic cardioverter/defibrillator (AICD)  [employer] AICD insertion 2015. s/p removal 8/2019. AICD replaced 2/2020

## 2020-10-10 LAB — SARS-COV-2 N GENE NPH QL NAA+PROBE: NOT DETECTED

## 2020-10-12 ENCOUNTER — APPOINTMENT (OUTPATIENT)
Dept: OTOLARYNGOLOGY | Facility: HOSPITAL | Age: 55
End: 2020-10-12

## 2020-10-12 ENCOUNTER — RESULT REVIEW (OUTPATIENT)
Age: 55
End: 2020-10-12

## 2020-10-12 ENCOUNTER — OUTPATIENT (OUTPATIENT)
Dept: OUTPATIENT SERVICES | Facility: HOSPITAL | Age: 55
LOS: 1 days | Discharge: HOME | End: 2020-10-12
Payer: COMMERCIAL

## 2020-10-12 ENCOUNTER — APPOINTMENT (OUTPATIENT)
Dept: SPEECH THERAPY | Facility: HOSPITAL | Age: 55
End: 2020-10-12

## 2020-10-12 VITALS
TEMPERATURE: 98 F | DIASTOLIC BLOOD PRESSURE: 65 MMHG | OXYGEN SATURATION: 100 % | HEART RATE: 74 BPM | WEIGHT: 156.09 LBS | RESPIRATION RATE: 16 BRPM | SYSTOLIC BLOOD PRESSURE: 117 MMHG | HEIGHT: 66 IN

## 2020-10-12 VITALS
DIASTOLIC BLOOD PRESSURE: 54 MMHG | RESPIRATION RATE: 14 BRPM | SYSTOLIC BLOOD PRESSURE: 120 MMHG | TEMPERATURE: 98 F | OXYGEN SATURATION: 100 % | HEART RATE: 77 BPM

## 2020-10-12 DIAGNOSIS — H90.3 SENSORINEURAL HEARING LOSS, BILATERAL: ICD-10-CM

## 2020-10-12 DIAGNOSIS — Z98.51 TUBAL LIGATION STATUS: Chronic | ICD-10-CM

## 2020-10-12 DIAGNOSIS — Z95.810 PRESENCE OF AUTOMATIC (IMPLANTABLE) CARDIAC DEFIBRILLATOR: Chronic | ICD-10-CM

## 2020-10-12 DIAGNOSIS — Z86.018 PERSONAL HISTORY OF OTHER BENIGN NEOPLASM: Chronic | ICD-10-CM

## 2020-10-12 LAB — HCG UR QL: NEGATIVE — SIGNIFICANT CHANGE UP

## 2020-10-12 PROCEDURE — 92516 FACIAL NERVE FUNCTION TEST: CPT

## 2020-10-12 PROCEDURE — 70250 X-RAY EXAM OF SKULL: CPT | Mod: 26

## 2020-10-12 PROCEDURE — 70450 CT HEAD/BRAIN W/O DYE: CPT | Mod: 26

## 2020-10-12 PROCEDURE — 15769 GRFG AUTOL SOFT TISS DIR EXC: CPT

## 2020-10-12 PROCEDURE — 69930 IMPLANT COCHLEAR DEVICE: CPT

## 2020-10-12 PROCEDURE — 69990 MICROSURGERY ADD-ON: CPT

## 2020-10-12 RX ORDER — ERGOCALCIFEROL 1.25 MG/1
1 CAPSULE ORAL
Qty: 0 | Refills: 0 | DISCHARGE

## 2020-10-12 RX ORDER — FENTANYL CITRATE 50 UG/ML
25 INJECTION INTRAVENOUS
Refills: 0 | Status: DISCONTINUED | OUTPATIENT
Start: 2020-10-12 | End: 2020-10-12

## 2020-10-12 RX ORDER — METOPROLOL TARTRATE 50 MG
1 TABLET ORAL
Qty: 0 | Refills: 0 | DISCHARGE

## 2020-10-12 RX ORDER — ONDANSETRON 8 MG/1
4 TABLET, FILM COATED ORAL ONCE
Refills: 0 | Status: DISCONTINUED | OUTPATIENT
Start: 2020-10-12 | End: 2020-10-26

## 2020-10-12 RX ORDER — SACUBITRIL AND VALSARTAN 24; 26 MG/1; MG/1
1 TABLET, FILM COATED ORAL
Qty: 0 | Refills: 0 | DISCHARGE

## 2020-10-12 RX ORDER — HYDROMORPHONE HYDROCHLORIDE 2 MG/ML
0.5 INJECTION INTRAMUSCULAR; INTRAVENOUS; SUBCUTANEOUS
Refills: 0 | Status: DISCONTINUED | OUTPATIENT
Start: 2020-10-12 | End: 2020-10-12

## 2020-10-12 RX ORDER — OXYCODONE HYDROCHLORIDE 5 MG/1
10 TABLET ORAL ONCE
Refills: 0 | Status: DISCONTINUED | OUTPATIENT
Start: 2020-10-12 | End: 2020-10-12

## 2020-10-12 RX ORDER — LINAGLIPTIN 5 MG/1
1 TABLET, FILM COATED ORAL
Qty: 0 | Refills: 0 | DISCHARGE

## 2020-10-12 RX ORDER — FLUTICASONE PROPIONATE 50 MCG
1 SPRAY, SUSPENSION NASAL
Qty: 0 | Refills: 0 | DISCHARGE

## 2020-10-12 RX ORDER — POTASSIUM CHLORIDE 20 MEQ
1 PACKET (EA) ORAL
Qty: 0 | Refills: 0 | DISCHARGE

## 2020-10-12 RX ORDER — ROSUVASTATIN CALCIUM 5 MG/1
1 TABLET ORAL
Qty: 0 | Refills: 0 | DISCHARGE

## 2020-10-12 RX ORDER — ASPIRIN/CALCIUM CARB/MAGNESIUM 324 MG
81 TABLET ORAL
Qty: 0 | Refills: 0 | DISCHARGE

## 2020-10-12 RX ORDER — INSULIN GLARGINE 100 [IU]/ML
15 INJECTION, SOLUTION SUBCUTANEOUS
Qty: 0 | Refills: 0 | DISCHARGE

## 2020-10-12 RX ORDER — LANOLIN ALCOHOL/MO/W.PET/CERES
1 CREAM (GRAM) TOPICAL
Qty: 0 | Refills: 0 | DISCHARGE

## 2020-10-12 RX ADMIN — SODIUM CHLORIDE 30 MILLILITER(S): 9 INJECTION, SOLUTION INTRAVENOUS at 09:28

## 2020-10-12 NOTE — ASU PREOP CHECKLIST - 1.
Pt has to communicate with writing Pt  hard of hearing, severe level. Use written communication when necessary

## 2020-10-12 NOTE — ASU DISCHARGE PLAN (ADULT/PEDIATRIC) - POST OP PHONE #
[place] [phone] pt. granted permission to leave message /and or speak with whoever answers the phone.

## 2020-10-12 NOTE — ASU DISCHARGE PLAN (ADULT/PEDIATRIC) - ASU DC SPECIAL INSTRUCTIONSFT
please follow the patient instruction handout    take clindamycin (antibiotics) x 1 week as directed  may take 1-2 tablets percocet every 6 hours as needed for pain  When not taking percocet, may use tylenol/ibuprofen as needed for pain  Hold aspirin for 48 hours, may resume aspirin on Wednesday

## 2020-10-12 NOTE — ASU DISCHARGE PLAN (ADULT/PEDIATRIC) - CARE PROVIDER_API CALL
Ilia Watkins)  Neurotology; Otolaryngology  [address] [place] [address]  Phone: [phone]  Fax: [phone]  Follow Up Time:

## 2020-10-12 NOTE — ASU DISCHARGE PLAN (ADULT/PEDIATRIC) - CALL YOUR DOCTOR IF YOU HAVE ANY OF THE FOLLOWING:
Fever greater than (need to indicate Fahrenheit or Celsius)/Nausea and vomiting that does not stop/Unable to urinate/Inability to tolerate liquids or foods/Swelling that gets worse/Increased irritability or sluggishness/Bleeding that does not stop/Wound/Surgical Site with redness, or foul smelling discharge or pus/Pain not relieved by Medications

## 2020-10-14 PROBLEM — Z95.810 PRESENCE OF AUTOMATIC (IMPLANTABLE) CARDIAC DEFIBRILLATOR: Chronic | Status: ACTIVE | Noted: 2020-09-18

## 2020-10-15 LAB — GLUCOSE BLDC GLUCOMTR-MCNC: 94 MG/DL — SIGNIFICANT CHANGE UP (ref 70–99)

## 2020-10-21 ENCOUNTER — APPOINTMENT (OUTPATIENT)
Dept: OTOLARYNGOLOGY | Facility: CLINIC | Age: 55
End: 2020-10-21
Payer: COMMERCIAL

## 2020-10-21 VITALS
TEMPERATURE: 97.6 F | BODY MASS INDEX: 24.75 KG/M2 | WEIGHT: 154 LBS | HEART RATE: 72 BPM | DIASTOLIC BLOOD PRESSURE: 63 MMHG | SYSTOLIC BLOOD PRESSURE: 98 MMHG | HEIGHT: 66 IN

## 2020-10-21 PROCEDURE — 99024 POSTOP FOLLOW-UP VISIT: CPT

## 2020-10-21 PROCEDURE — 99072 ADDL SUPL MATRL&STAF TM PHE: CPT

## 2020-10-21 NOTE — HISTORY OF PRESENT ILLNESS
[de-identified] : no pain or drainage, perceives worsening of preexisting imbalance since surgery.

## 2020-10-21 NOTE — PHYSICAL EXAM
[de-identified] : normally positioned internal processor, postauricular incision c/d/i [de-identified] : AD:  TM intact, thickened [Normal] : no rashes [FreeTextEntry2] : Facial nerve function is House Brackmann 1/6 in right ear and 1/6 in left ear.

## 2020-10-21 NOTE — CONSULT LETTER
[FreeTextEntry2] : Arturo Lema MD [FreeTextEntry1] : Dear Arturo,\par \par Hanane Fontana presents for followup.  She is now one week status post right cochlear implantation.  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  This was not suggested by her preoperative CAT scan or MRI.  We performed a partial drill out of the basal turn of the cochlea and were able to insert a  electrode through the basal turn, which is stiffer and allows placement through partial fibrosis.  Since surgery she has doing well without pain or drainage, although she did have some exacerbation of her pre-existing dizziness.  Otoscopic exam today shows normal bilateral facial nerve function, a normally positioned internal processor, and a healed postauricular incision.\par \par Fortunately we were able to achieve insertion of approximately 3/4 of the electrode at the time of surgery, but the presence of significant fibrosis does suggest reduce likelihood of significant hearing improvement with the implant.  She is scheduled for activation in 2 weeks, and I'll see her back in 6 weeks.  She also perceives a gradually worsening decline in her hearing in the left better hearing ear, and should she not develop benefit with a right cochlear implant we discussed the the left side would also be an option.\par \par Thank you once again for the opportunity to participate in your patient's care, and I will continue to keep you updated as to her progress.\par \par Best regards,\par \par Dorian Watkins MD\par Otology/Neurotology\par Department of Otolaryngology\par [facility]

## 2020-11-04 ENCOUNTER — APPOINTMENT (OUTPATIENT)
Dept: SPEECH THERAPY | Facility: CLINIC | Age: 55
End: 2020-11-04

## 2020-11-19 ENCOUNTER — APPOINTMENT (OUTPATIENT)
Dept: PHARMACY | Facility: CLINIC | Age: 55
End: 2020-11-19
Payer: SELF-PAY

## 2020-11-19 PROCEDURE — V5299A: CUSTOM | Mod: NC

## 2020-12-02 ENCOUNTER — APPOINTMENT (OUTPATIENT)
Dept: OTOLARYNGOLOGY | Facility: CLINIC | Age: 55
End: 2020-12-02
Payer: COMMERCIAL

## 2020-12-02 VITALS
DIASTOLIC BLOOD PRESSURE: 66 MMHG | TEMPERATURE: 97.7 F | HEART RATE: 63 BPM | HEIGHT: 66 IN | BODY MASS INDEX: 24.75 KG/M2 | WEIGHT: 154 LBS | SYSTOLIC BLOOD PRESSURE: 106 MMHG

## 2020-12-02 PROCEDURE — 99024 POSTOP FOLLOW-UP VISIT: CPT

## 2020-12-02 NOTE — PHYSICAL EXAM
[de-identified] : normally positioned internal processor, postauricular incision c/d/i [de-identified] : AD:  TM intact, no effusion [FreeTextEntry2] : Facial nerve function is House Brackmann 1/6 in right ear and 1/6 in left ear.

## 2020-12-02 NOTE — HISTORY OF PRESENT ILLNESS
[de-identified] : reports improved sound awareness with the CI on the right.  Dizziness back to baseline.

## 2020-12-02 NOTE — CONSULT LETTER
[FreeTextEntry2] : Arturo Lema MD [FreeTextEntry1] : Dear Arturo,\par \par Hanane Fontana presents for reevaluation.  She is now 6 weeks status post right cochlear implant.  Since her last visit she has undergone implant activation, and fortunately despite the extensive fibrosis noted at the time of surgery and poor intraoperative NRT responses, she does now perceive significant sound awareness in the right ear.  She has been working with the audiology team for adjustments in the cochlear implant mapping and will continue to do so in the new few months.  We again briefly discussed the option of an implant for the left ear in the future, but she is not ready to pursue this until we are able to ascertain the extent of her benefit with the right implant.  I will see her back in 3 months.\par \par Thank you once again for the opportunity to participate in your patient's care, and I will continue to keep you updated as to her progress.\par \par Best regards,\par \par Dorian Watkins MD\par Otology/Neurotology\par Department of Otolaryngology\par [facility]

## 2020-12-07 ENCOUNTER — APPOINTMENT (OUTPATIENT)
Dept: SPEECH THERAPY | Facility: CLINIC | Age: 55
End: 2020-12-07

## 2020-12-21 ENCOUNTER — APPOINTMENT (OUTPATIENT)
Dept: CARDIOLOGY | Facility: CLINIC | Age: 55
End: 2020-12-21
Payer: COMMERCIAL

## 2020-12-21 VITALS
SYSTOLIC BLOOD PRESSURE: 112 MMHG | HEART RATE: 72 BPM | BODY MASS INDEX: 25.07 KG/M2 | HEIGHT: 66 IN | DIASTOLIC BLOOD PRESSURE: 60 MMHG | OXYGEN SATURATION: 98 % | WEIGHT: 156 LBS | TEMPERATURE: 97.1 F

## 2020-12-21 PROCEDURE — 99214 OFFICE O/P EST MOD 30 MIN: CPT

## 2020-12-21 PROCEDURE — 99072 ADDL SUPL MATRL&STAF TM PHE: CPT

## 2020-12-21 PROCEDURE — 93282 PRGRMG EVAL IMPLANTABLE DFB: CPT

## 2020-12-21 RX ORDER — CLINDAMYCIN HYDROCHLORIDE 300 MG/1
300 CAPSULE ORAL
Qty: 2 | Refills: 0 | Status: DISCONTINUED | COMMUNITY
Start: 2020-09-25 | End: 2020-12-21

## 2020-12-21 NOTE — HISTORY OF PRESENT ILLNESS
[Preoperative Visit] : for a medical evaluation prior to surgery [Scheduled Procedure ___] : a [unfilled] [Date of Surgery ___] : on [unfilled] [Surgeon Name ___] : surgeon: [unfilled] [Stable] : Stable [FreeTextEntry1] : \par Hanane is a pleasant 55 y/o female who presents today for device interrogation, echocardiogram, and requesting preop cardiac clearance for cochlear implant. \par \par Detailed history includes:\par 1.  Nonischemic non-dilated cardiomyopathy.\par 2.  [employer] single lead AICD for primary prevention\par 3.  Essential hypertension.  CHF.  Mild CKD.  Non-smoker\par 4.  Type 2 diabetes mellitus.\par 5.  Hyperlipidemia on statin therapy\par 6.  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  History of vasculitis and acute hearing loss\par 8.  Mitral and tricuspid regurgitation.\par \par There has been no recent illness or hospitalization. Recently started on low dose entresto and taken off lasix. She has remained stable from cardiac standpoint without evidence of volume overload. Renal function and electrolytes are stable per recent labs. She was dizzy at initiation of txt which resolved after a few weeks. Reports stable functional status. Denies exertional chest pain or discomfort. Denies unusual shortness of breath, orthopnea, weight gain, or LE edema. Denies palpitations or syncope. \par \par EKG today shows NSR with chronic LBBB\par Echo today shows LVEF 20-25%, global LV dysfunction. Borderline PH. Mild VHD. Overall no sig change. \par \par Labs 9/1/20 k 4.4, creat 0.83, BNP 91

## 2020-12-21 NOTE — REVIEW OF SYSTEMS
[Loss Of Hearing] : hearing loss [see HPI] : see HPI [Dizziness] : dizziness [Negative] : Heme/Lymph [Dyspnea on exertion] : not dyspnea during exertion [Chest  Pressure] : no chest pressure [Chest Pain] : no chest pain [Lower Ext Edema] : no extremity edema [Leg Claudication] : no intermittent leg claudication [Palpitations] : no palpitations [Tremor] : no tremor was seen [Numbness (Hypesthesia)] : no numbness [Convulsions] : no convulsions [Tingling (Paresthesia)] : no tingling

## 2020-12-21 NOTE — PROCEDURE
[No] : not [See Device Printout] : See device printout [ICD] : Implantable cardioverter-defibrillator [VVI] : VVI [Lead Imp:  ___ohms] : lead impedance was [unfilled] ohms [Sensing Amplitude ___mv] : sensing amplitude was [unfilled] mv [___V @] : [unfilled] V [___ ms] : [unfilled] ms [None] : none [Pace ___ %] : Pace [unfilled]% [Counters Reset] : the counters were not reset [de-identified] : MANNY [de-identified] : [employer] [de-identified] : [id number] [de-identified] : 2/13/2020 [de-identified] : 40 [de-identified] : 8.6 years [de-identified] : AICD with normal sensing, capture, battery\par No events\par Office visit today with Dr. Dixon\par \par F/U device check in 3 months\par Discussed red flag symptoms, which would warrant sooner or emergent medical evaluation.\par Any questions and concerns were addressed and resolved.\par \par Sincerely,\par Dorie Garcia FNP-BC\par Patient's history, testing, and plan was reviewed with supervising physician, Dr. Helena Dixon

## 2020-12-21 NOTE — DISCUSSION/SUMMARY
[FreeTextEntry1] : ALEKSANDR MARTEL is a 54 year old F who presents today Sep 09, 2020 with the above history and the following active issues: \par \par #1.  Chronic systolic heart failure because of nonischemic nondilated cardiomyopathy at present class II heart failure symptoms without signs of volume overload. BNP wnl. LVEF remains 20-25% with ICD. \par Continue metoprolol to be taken at nighttime.\par Had dizziness on lasix and Entresto 24/26 twice daily so lasix was discontinued. \par Would recommend PRN use for any fluid OL.\par Renal function and electrolytes are stable per recent labs. BP stable. \par Continue entresto. Call for any further dizziness. \par If unable to tolerate we will consider changing it to lower dose of ACE inhibitor or angiotensin receptor blocker. Unable to add spironolactone at this time d/t low BP/dizziness. \par Low-salt diet.  Close follow-up on weight strongly recommended.\par Pt and  agreeable for consultation with HF specialist, will refer to Dr. Monroy in [place]. \par \par 2.   AICD [employer] single lead shows no events, stable battery and lead measurements. Continue to follow q 3 mo. \par \par 3.  History of tricuspid valve vegetation. Echo shows stable findings. Last C-reactive protein was 3.6 and sed rate was 6 in May 2020. She has no fever or chills no element of distal embolization at present. Continue surveillance. \par \par 4.  Hyperlipidemia.  Continue present regimen of medications.  Follow-up lipid panel in the future. \par \par 5.  Essential hypertension at present stable no significant postural shifts.  We will continue to follow.\par \par 6. Preop cardiac clearance, cochlear implant - \par At present chronic cardiac conditions noted above are stable. \par Baseline functional status is acceptable.    \par The clinical benefit of the proposed procedure outweighs the associated cardiovascular risk.  \par Risk not attenuated with further CV testing.  \par Prior testing as outlined above.\par Optimized from a cardiovascular perspective to proceed.\par Minimize time off ASA, may hold 5-7 days prior\par Continue beta blocker and entresto\par ICD management as per routine valente-op/ guidelines\par DVT ppx\par Even fluid balance with h/o CHF \par \par Please do not hesitate to call for any questions or concerns. \par \par Sincerely,\par \par GERBER Chacko\par Patients history, testing, and plan reviewed with supervising MD: Dr. Callie Lopez

## 2020-12-21 NOTE — PHYSICAL EXAM
[General Appearance - Well Developed] : well developed [Normal Appearance] : normal appearance [Well Groomed] : well groomed [General Appearance - Well Nourished] : well nourished [No Deformities] : no deformities [General Appearance - In No Acute Distress] : no acute distress [Respiration, Rhythm And Depth] : normal respiratory rhythm and effort [Exaggerated Use Of Accessory Muscles For Inspiration] : no accessory muscle use [Auscultation Breath Sounds / Voice Sounds] : lungs were clear to auscultation bilaterally [Heart Rate And Rhythm] : heart rate and rhythm were normal [Heart Sounds] : normal S1 and S2 [Murmurs] : no murmurs present [Edema] : no peripheral edema present [Abdomen Soft] : soft [Abdomen Tenderness] : non-tender [Abdomen Mass (___ Cm)] : no abdominal mass palpated [Nail Clubbing] : no clubbing of the fingernails [Cyanosis, Localized] : no localized cyanosis [Petechial Hemorrhages (___cm)] : no petechial hemorrhages [Skin Color & Pigmentation] : normal skin color and pigmentation [] : no rash [No Venous Stasis] : no venous stasis [Skin Lesions] : no skin lesions [No Skin Ulcers] : no skin ulcer [No Xanthoma] : no  xanthoma was observed [Oriented To Time, Place, And Person] : oriented to person, place, and time [Affect] : the affect was normal [Mood] : the mood was normal [No Anxiety] : not feeling anxious [FreeTextEntry1] : slow gait with cane

## 2021-01-05 ENCOUNTER — APPOINTMENT (OUTPATIENT)
Dept: SPEECH THERAPY | Facility: CLINIC | Age: 56
End: 2021-01-05

## 2021-01-05 ENCOUNTER — OUTPATIENT (OUTPATIENT)
Dept: OUTPATIENT SERVICES | Facility: HOSPITAL | Age: 56
LOS: 1 days | Discharge: HOME | End: 2021-01-05

## 2021-01-05 DIAGNOSIS — Z86.018 PERSONAL HISTORY OF OTHER BENIGN NEOPLASM: Chronic | ICD-10-CM

## 2021-01-05 DIAGNOSIS — Z98.51 TUBAL LIGATION STATUS: Chronic | ICD-10-CM

## 2021-01-05 DIAGNOSIS — Z95.810 PRESENCE OF AUTOMATIC (IMPLANTABLE) CARDIAC DEFIBRILLATOR: Chronic | ICD-10-CM

## 2021-01-13 ENCOUNTER — NON-APPOINTMENT (OUTPATIENT)
Age: 56
End: 2021-01-13

## 2021-01-21 DIAGNOSIS — H90.3 SENSORINEURAL HEARING LOSS, BILATERAL: ICD-10-CM

## 2021-02-24 ENCOUNTER — APPOINTMENT (OUTPATIENT)
Dept: HEART FAILURE | Facility: CLINIC | Age: 56
End: 2021-02-24
Payer: COMMERCIAL

## 2021-02-24 VITALS
HEART RATE: 62 BPM | BODY MASS INDEX: 25.71 KG/M2 | TEMPERATURE: 98.1 F | HEIGHT: 66 IN | SYSTOLIC BLOOD PRESSURE: 125 MMHG | RESPIRATION RATE: 16 BRPM | WEIGHT: 160 LBS | OXYGEN SATURATION: 100 % | DIASTOLIC BLOOD PRESSURE: 71 MMHG

## 2021-02-24 PROCEDURE — 99215 OFFICE O/P EST HI 40 MIN: CPT

## 2021-02-24 PROCEDURE — 99205 OFFICE O/P NEW HI 60 MIN: CPT

## 2021-02-24 PROCEDURE — 99072 ADDL SUPL MATRL&STAF TM PHE: CPT

## 2021-02-24 RX ORDER — FLUTICASONE PROPIONATE 50 UG/1
50 SPRAY, METERED NASAL
Refills: 0 | Status: DISCONTINUED | COMMUNITY
End: 2021-02-24

## 2021-02-24 RX ORDER — POTASSIUM CHLORIDE 1500 MG/1
20 TABLET, EXTENDED RELEASE ORAL EVERY OTHER DAY
Refills: 0 | Status: DISCONTINUED | COMMUNITY
End: 2021-02-24

## 2021-02-24 RX ORDER — HYDROCODONE BITARTRATE AND ACETAMINOPHEN 5; 325 MG/1; MG/1
5-325 TABLET ORAL
Qty: 10 | Refills: 0 | Status: DISCONTINUED | COMMUNITY
Start: 2020-10-12 | End: 2021-02-24

## 2021-02-24 RX ORDER — PNEUMOCOCCAL 13-VALENT CONJUGATE VACCINE 2.2; 2.2; 2.2; 2.2; 2.2; 4.4; 2.2; 2.2; 2.2; 2.2; 2.2; 2.2; 2.2 UG/.5ML; UG/.5ML; UG/.5ML; UG/.5ML; UG/.5ML; UG/.5ML; UG/.5ML; UG/.5ML; UG/.5ML; UG/.5ML; UG/.5ML; UG/.5ML; UG/.5ML
INJECTION, SUSPENSION INTRAMUSCULAR
Qty: 1 | Refills: 0 | Status: DISCONTINUED | COMMUNITY
Start: 2020-06-29 | End: 2021-02-24

## 2021-02-24 NOTE — HISTORY OF PRESENT ILLNESS
[FreeTextEntry1] : Referring: Dr. Callie Lopez\par \par Ms. Fontana is a 56 YO F with a history of ACC/AHA Stage C NICM (LV 4.7 cm, LVEF 20-25%), s/p ICD c/b infection with TV endocarditis s/p ICD extraction and reimplantation, DM2, vasculitis on rituximab, bilateral sensorineural hearing loss s/p cochlear implant, and HTN presenting to HF clinic for further management.\par \par She was diagnosed with heart failure in 2015 where she was found to have LVEF of approximately 30%, she was apparently asymptomatic at this time.  A coronary angiogram was negative for obstructive CAD and due to presence of a LBBB underwent implantation of a CRT-D. \par \par She was admitted to [facility] in 2019 for 4 months with multiorgan failure, DKA, and sepsis where she was found to have TV endocarditis. At this hospital there was concern for vasculitis for which she was treated with high dose steroids and rituximab. The ICD was extracted and she as treated with antibiotics before she was reimplanted with a single chamber ICD.\par \par Of note, during that prolonged admission she developed bilateral sensorineural hearing loss and has since been implanted with a cochlear implant.\par \par She has not had HF related hospitalizations. \par \par She presents today for HF consultation. She ambulates with a rolling walker due to instability from her hearing loss and fatigue. She denies overt dyspnea exertion and is primarily limited by fatigue. She denies orthopnea. Denies any lower extremity edema. Denies dizziness (aside from balance issues), lightheadedness, or presyncope. Denies chest pain or chest pressure. Denies palpitations. \par \par

## 2021-02-24 NOTE — ASSESSMENT
[FreeTextEntry1] : 54 YO F with a history of ACC/AHA Stage C NICM (LV 4.7 cm, LVEF 20-25%), s/p ICD c/b infection with TV endocarditis s/p ICD extraction and reimplantation, DM2, vasculitis on Rituximab, bilateral sensorineural hearing loss, and HTN presenting to HF clinic for further management.\par \par Today she reports NYHA 1 symptoms and appears euvolemic on exam. Reassuringly she has not had HF admissions and has a normal BNP level. She has room for uptitration of neurohormonal therapy.

## 2021-02-24 NOTE — PHYSICAL EXAM
[Normal Jugular Venous V Waves Present] : normal jugular venous V waves present [Heart Rate And Rhythm] : heart rate and rhythm were normal [Heart Sounds] : normal S1 and S2 [Murmurs] : no murmurs present [] : no respiratory distress [Respiration, Rhythm And Depth] : normal respiratory rhythm and effort [Auscultation Breath Sounds / Voice Sounds] : lungs were clear to auscultation bilaterally [Bowel Sounds] : normal bowel sounds [Abdomen Soft] : soft [FreeTextEntry1] : Warm, no edema  [Skin Color & Pigmentation] : normal skin color and pigmentation [Oriented To Time, Place, And Person] : oriented to person, place, and time [Impaired Insight] : insight and judgment were intact

## 2021-02-24 NOTE — DISCUSSION/SUMMARY
[FreeTextEntry1] : # Chronic systolic heart failure\par - Etiology: unclear etiology. I am unsure at this time if device is MRI compatible or if there are retained leads from prior ICD extraction \par - GDMT: current regimen is Entresto 24-26 BID, metoprolol succinate 50 mg daily. will increase Entresto to 49-51 mg BID, start spironolactone 25 mg daily, and stop potassium supplements. deferring SGL2i given insulin dependent diabetes with prior DKA \par - Diuretic: euvolemic off diuretics\par - Labs: 9/2020 with Na 145, K 4.4, Cr 0.8, proBNP 92. Repeat labs in 1 week after above changes \par \par # LBBB with prior CRT, now explanted for infection and reimplanted with SC-ICD\par - last interrogation 12/2020, no events and does not appear to be pacing significantly\par - will discuss details regarding ICD reimplantation with Dr. Lopez, unclear why CS lead not placed at that time given LBBB and she may benefit from synchronized pacing \par \par # Vasculitis\par - per patient, she has a diagnosis of a vasculitis and is being treated with q6 month rituximab \par - she follows with Dr. Okeefe at [facility]\par \par RTC in 4 months at [facility] with Dr. Duque which is easier for the patient geographically \par

## 2021-03-05 ENCOUNTER — APPOINTMENT (OUTPATIENT)
Dept: OTOLARYNGOLOGY | Facility: CLINIC | Age: 56
End: 2021-03-05
Payer: COMMERCIAL

## 2021-03-05 PROCEDURE — 92504 EAR MICROSCOPY EXAMINATION: CPT

## 2021-03-05 PROCEDURE — 92557 COMPREHENSIVE HEARING TEST: CPT

## 2021-03-05 PROCEDURE — 92567 TYMPANOMETRY: CPT

## 2021-03-05 PROCEDURE — 99072 ADDL SUPL MATRL&STAF TM PHE: CPT

## 2021-03-05 PROCEDURE — 99213 OFFICE O/P EST LOW 20 MIN: CPT | Mod: 25

## 2021-03-05 NOTE — CONSULT LETTER
[FreeTextEntry2] : Arturo Lema MD [FreeTextEntry1] : Dear Arturo,\par \par Hanane Fontana presents for follow-up.  She is now nearly 5 months status post right cochlear implant surgery.  She has been wearing her implant consistently, but ufnortunately with only minor sound awareness and without speech recognition.  Her otoscopic exam today once again shows a normally positioned right internal processor and normal tympanic membrane with healed postauricular incision.  The left ear is unchanged and appears normal.  A new audiogram today shows some decline in her sensorineural thresholds in the left ear relative to the last audiogram on record, with poor speech discrimination in this ear.\par \par Given the further decline in her speech discrimination in her left ear better hearing ear, combined with the poor performance in the right ear, I did  her that she is a candidate for a cochlear implant in the left ear.  She remains very hesitant to move forward with surgery in this ear given the risk of complete loss of sound awareness that she still has in this ear.  For now she will continue hearing aid usage on the left side and follow-up for cochlear implant programming adjustments in the right ear.  I will see her back in 6 months or sooner with any change in symptoms.\par \par Thank you once again for the opportunity to participate in your patient's care, and I will keep you informed as to her progress.\par \par Best regards,\par \par Dorian Watkins MD\par Otology/Neurotology\par [facility]\par [facility]

## 2021-03-05 NOTE — PHYSICAL EXAM
[Normal] : no rashes [de-identified] : Right CI, surgical sites well healed.  Processor in proper position

## 2021-03-05 NOTE — REASON FOR VISIT
[Subsequent Evaluation] : a subsequent evaluation for [FreeTextEntry2] : s/p  Right cochlear implantation with mastoidectomy, tissue graft, and facial  nerve monitoring, 10/12/2020

## 2021-03-05 NOTE — HISTORY OF PRESENT ILLNESS
[de-identified] : 55 year old female s/p  Right cochlear implantation with mastoidectomy, tissue graft, and facial  nerve monitoring, 10/12/2020.  Pt. repots that the CI is helping her hearing ear, which is currently aided with a HA.  If LAL is out, still reports no hearing from the implanted ear.   Denies pain, fever, or discharge.

## 2021-03-05 NOTE — PROCEDURE
[FreeTextEntry3] : Procedure note:  Binocular microscopy\par \par Inspection of the ears was performed under binocular microscopy because of need to accurately visualize otologic landmarks and potential pathologic conditions that would not otherwise be visible through simple otoscopic exam.

## 2021-03-09 ENCOUNTER — APPOINTMENT (OUTPATIENT)
Dept: SPEECH THERAPY | Facility: CLINIC | Age: 56
End: 2021-03-09

## 2021-03-15 LAB
ANION GAP SERPL CALC-SCNC: 9 MMOL/L
BUN SERPL-MCNC: 24 MG/DL
CALCIUM SERPL-MCNC: 10.2 MG/DL
CHLORIDE SERPL-SCNC: 109 MMOL/L
CO2 SERPL-SCNC: 26 MMOL/L
CREAT SERPL-MCNC: 1.08 MG/DL
GLUCOSE SERPL-MCNC: 96 MG/DL
POTASSIUM SERPL-SCNC: 4.9 MMOL/L
SODIUM SERPL-SCNC: 143 MMOL/L

## 2021-03-23 ENCOUNTER — APPOINTMENT (OUTPATIENT)
Dept: CARDIOLOGY | Facility: CLINIC | Age: 56
End: 2021-03-23
Payer: COMMERCIAL

## 2021-03-23 PROCEDURE — 93282 PRGRMG EVAL IMPLANTABLE DFB: CPT

## 2021-03-23 PROCEDURE — 99072 ADDL SUPL MATRL&STAF TM PHE: CPT

## 2021-03-23 NOTE — PROCEDURE
[No] : not [See Device Printout] : See device printout [ICD] : Implantable cardioverter-defibrillator [VVI] : VVI [Lead Imp:  ___ohms] : lead impedance was [unfilled] ohms [Sensing Amplitude ___mv] : sensing amplitude was [unfilled] mv [___V @] : [unfilled] V [___ ms] : [unfilled] ms [None] : none [Counters Reset] : the counters were reset [Pace ___ %] : Pace [unfilled]% [de-identified] : MANNY [de-identified] : Fortify Assura [de-identified] : [id number] [de-identified] : 2/13/2020 [de-identified] : 40 [de-identified] : 8.5 years [de-identified] : \par AICD with normal sensing, capture, battery\par \par No events noted on today's interrogation\par Metoprolol refilled at pt request\par \par Of note, she underwent Cochlear implant which was unsuccessful d/t too much "fibrosis". Pt accompanied by her  at office visits. \par \par F/U device check in 3 months. Same day as office visit with Dr. Lopez. \par

## 2021-04-07 NOTE — ASU PATIENT PROFILE, ADULT - ABILITY TO HEAR (WITH HEARING AID OR HEARING APPLIANCE IF NORMALLY USED):
Mildly to Moderately Impaired: difficulty hearing in some environments or speaker may need to increase volume or speak distinctly Mildly to Moderately Impaired: difficulty hearing in some environments or speaker may need to increase volume or speak distinctly/wears left hearing aide present

## 2021-04-19 ENCOUNTER — APPOINTMENT (OUTPATIENT)
Dept: SPEECH THERAPY | Facility: CLINIC | Age: 56
End: 2021-04-19

## 2021-06-01 ENCOUNTER — APPOINTMENT (OUTPATIENT)
Dept: HEART FAILURE | Facility: CLINIC | Age: 56
End: 2021-06-01

## 2021-06-01 NOTE — HISTORY OF PRESENT ILLNESS
[FreeTextEntry1] : Ms. 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  A coronary angiogram was negative for obstructive CAD and due to presence of a LBBB underwent implantation of a CRT-D. \par \par She was admitted to [facility] in 2019 for 4 months with multiorgan failure, DKA, and sepsis where she was found to have TV endocarditis. At this hospital there was concern for vasculitis for which she was treated with high dose steroids and rituximab. The ICD was extracted and she as treated with antibiotics before she was reimplanted with a single chamber ICD.\par \par Of note, during that prolonged admission she developed bilateral sensorineural hearing loss and has since been implanted with a cochlear implant.\par \par She has not had HF related hospitalizations. \par \par She presents today for HF consultation. She ambulates with a rolling walker due to instability from her hearing loss and fatigue. She denies overt dyspnea exertion and is primarily limited by fatigue. She denies orthopnea. Denies any lower extremity edema. Denies dizziness (aside from balance issues), lightheadedness, or presyncope. Denies chest pain or chest pressure. Denies palpitations. \par \par

## 2021-06-01 NOTE — ASSESSMENT
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 Reassuringly she has not had HF admissions and has a normal BNP level. She has room for uptitration of neurohormonal therapy.

## 2021-06-01 NOTE — DISCUSSION/SUMMARY
[FreeTextEntry1] : # Chronic systolic heart failure\par - Etiology: unclear etiology. I am unsure at this time if device is MRI compatible or if there are retained leads from prior ICD extraction \par - GDMT: current regimen is Entresto 24-26 BID, metoprolol succinate 50 mg daily. will increase Entresto to 49-51 mg BID, start spironolactone 25 mg daily, and stop potassium supplements. deferring SGL2i given insulin dependent diabetes with prior DKA \par - Diuretic: euvolemic off diuretics\par - Labs: 9/2020 with Na 145, K 4.4, Cr 0.8, proBNP 92. Repeat labs in 1 week after above changes \par \par # LBBB with prior CRT, now explanted for infection and reimplanted with SC-ICD\par - last interrogation 12/2020, no events and does not appear to be pacing significantly\par - will discuss details regarding ICD reimplantation with Dr. Lopez, unclear why CS lead not placed at that time given LBBB and she may benefit from synchronized pacing \par \par # Vasculitis\par - per patient, she has a diagnosis of a vasculitis and is being treated with q6 month rituximab \par - she follows with Dr. Okeefe at [facility]\par \par RTC in 4 months at [facility] with Dr. Duque which is easier for the patient geographically \par

## 2021-06-09 ENCOUNTER — APPOINTMENT (OUTPATIENT)
Dept: CARDIOLOGY | Facility: CLINIC | Age: 56
End: 2021-06-09
Payer: COMMERCIAL

## 2021-06-09 VITALS
HEART RATE: 71 BPM | DIASTOLIC BLOOD PRESSURE: 60 MMHG | HEIGHT: 66 IN | WEIGHT: 160 LBS | SYSTOLIC BLOOD PRESSURE: 104 MMHG | OXYGEN SATURATION: 99 % | BODY MASS INDEX: 25.71 KG/M2

## 2021-06-09 PROCEDURE — 99072 ADDL SUPL MATRL&STAF TM PHE: CPT

## 2021-06-09 PROCEDURE — 93282 PRGRMG EVAL IMPLANTABLE DFB: CPT

## 2021-06-09 PROCEDURE — 99214 OFFICE O/P EST MOD 30 MIN: CPT

## 2021-06-09 NOTE — PROCEDURE
[No] : not [See Device Printout] : See device printout [ICD] : Implantable cardioverter-defibrillator [VVI] : VVI [Lead Imp:  ___ohms] : lead impedance was [unfilled] ohms [Sensing Amplitude ___mv] : sensing amplitude was [unfilled] mv [___V @] : [unfilled] V [___ ms] : [unfilled] ms [None] : none [Counters Reset] : the counters were reset [Pace ___ %] : Pace [unfilled]% [de-identified] : MANNY [de-identified] : Fortify Assura [de-identified] : [id number] [de-identified] : 2/13/2020 [de-identified] : 40 [de-identified] : 8.3 years [de-identified] : \par AICD with normal sensing, capture, battery\par \par No events noted on today's interrogation\par Of note, she underwent Cochlear implant which was unsuccessful d/t too much "fibrosis". Pt accompanied by her  at office visits. \par \par F/U device check in 3 months. Same day as office visit with Dr. Lopez. \par \par Sincerely,\par \par Madeleine Aviles PA-C\par Patients history, testing and plan reviewed with supervising MD: Dr. Callie Lopez \par

## 2021-06-09 NOTE — PHYSICAL EXAM
[Normal Conjunctiva] : the conjunctiva exhibited no abnormalities [Eyelids - No Xanthelasma] : the eyelids demonstrated no xanthelasmas [Normal Jugular Venous A Waves Present] : normal jugular venous A waves present [Normal Jugular Venous V Waves Present] : normal jugular venous V waves present [No Jugular Venous Whipple A Waves] : no jugular venous whipple A waves [Respiration, Rhythm And Depth] : normal respiratory rhythm and effort [Exaggerated Use Of Accessory Muscles For Inspiration] : no accessory muscle use [Auscultation Breath Sounds / Voice Sounds] : lungs were clear to auscultation bilaterally [Heart Rate And Rhythm] : heart rate and rhythm were normal [Heart Sounds] : normal S1 and S2 [Murmurs] : no murmurs present [Abdomen Soft] : soft [Abdomen Tenderness] : non-tender [Abdomen Mass (___ Cm)] : no abdominal mass palpated [Abnormal Walk] : normal gait [Gait - Sufficient For Exercise Testing] : the gait was sufficient for exercise testing [Nail Clubbing] : no clubbing of the fingernails [Cyanosis, Localized] : no localized cyanosis [Petechial Hemorrhages (___cm)] : no petechial hemorrhages [Skin Color & Pigmentation] : normal skin color and pigmentation [] : no rash [No Venous Stasis] : no venous stasis [Skin Lesions] : no skin lesions [No Skin Ulcers] : no skin ulcer [No Xanthoma] : no  xanthoma was observed [Oriented To Time, Place, And Person] : oriented to person, place, and time [Affect] : the affect was normal [Mood] : the mood was normal [No Anxiety] : not feeling anxious

## 2021-06-09 NOTE — DISCUSSION/SUMMARY
[FreeTextEntry1] : ALEKSANDR MARTEL  is a 55 year F  who presents today Jun 09, 2021 with the above history and the following active issues. \par \par Chronic systolic heart failure because of nonischemic nondilated cardiomyopathy at present class II heart failure symptoms without signs of volume overload.  Not on ACE inhibitor/angiotensin receptor blocker or spironolactone.\par Continue metoprolol to be taken at nighttime\par Continue use of Entresto 49-51mg twice daily has been started.  \par Low-salt diet.  Close follow-up on weight strongly recommended.\par \par  AICD [place] single lead. AICD interrogated today with normal sensing, capture and impedance. No AICD discharge. \par \par Hyperlipidemia.  Continue present regimen of medications.  Follow-up labs.\par \par Essential hypertension. Blood pressure well controlled on my assessment. Continue current medication at current dosing.\par \par History of tricuspid valve vegetation. Echo with stable findings. \par \par Palpitations - no new recorded events on AICD interrogation today. Recommend 3 day ZIO monitor. Results can be reviewed with the patient over the phone. \par \par Red flag symptoms which would warrant sooner emergent evaluation reviewed with the patient. \par Questions and concerns were addressed and answered. \par \par Sincerely,\par \par Madeleine Aviles PA-C\par Patients history, testing and plan reviewed with supervising MD: Dr. Callie Lopez

## 2021-06-09 NOTE — REASON FOR VISIT
[Follow-Up - Clinic] : a clinic follow-up of [Cardiomyopathy] : cardiomyopathy [Heart Failure] : congestive heart failure [Hyperlipidemia] : hyperlipidemia [Family Member] : family member [FreeTextEntry1] : History of tricuspid valve endocarditis, AICD

## 2021-06-09 NOTE — HISTORY OF PRESENT ILLNESS
[FreeTextEntry1] : ALEKSANDR MARTEL  is a 55 year F  who presents today Jun 09, 2021 in clinical follow-up and for AICD interrogation. AICD with normal sensing, capture, impedance. No new recorded events. No AICD discharge. Complaints of occasional palpitations with lying on her side. \par Today she denies chest pain, pressure, unusual shortness of breath, lightheadedness, dizziness, near syncope or syncope. \par Walking with a cane with no new exertional complaints. \par Medications have remained unchanged. \par \par \par \par 1.  Nonischemic non-dilated cardiomyopathy.\par 2.  [place] single lead AICD for primary prevention\par 3.  Essential hypertension.  CHF.  Mild CKD.  Non-smoker\par 4.  Type 2 diabetes mellitus.\par 5.  Hyperlipidemia on statin therapy\par 6.  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  History of vasculitis and acute hearing loss\par 8.  Mitral and tricuspid regurgitation.

## 2021-06-15 ENCOUNTER — APPOINTMENT (OUTPATIENT)
Dept: CARDIOLOGY | Facility: CLINIC | Age: 56
End: 2021-06-15

## 2021-06-25 ENCOUNTER — APPOINTMENT (OUTPATIENT)
Dept: HEART FAILURE | Facility: CLINIC | Age: 56
End: 2021-06-25
Payer: COMMERCIAL

## 2021-06-25 ENCOUNTER — NON-APPOINTMENT (OUTPATIENT)
Age: 56
End: 2021-06-25

## 2021-06-25 VITALS
HEIGHT: 66 IN | SYSTOLIC BLOOD PRESSURE: 110 MMHG | WEIGHT: 166 LBS | HEART RATE: 73 BPM | TEMPERATURE: 98 F | OXYGEN SATURATION: 98 % | BODY MASS INDEX: 26.68 KG/M2 | DIASTOLIC BLOOD PRESSURE: 78 MMHG

## 2021-06-25 PROCEDURE — 93000 ELECTROCARDIOGRAM COMPLETE: CPT

## 2021-06-25 PROCEDURE — 99072 ADDL SUPL MATRL&STAF TM PHE: CPT

## 2021-06-25 PROCEDURE — 99215 OFFICE O/P EST HI 40 MIN: CPT

## 2021-06-25 RX ORDER — INSULIN GLARGINE 100 [IU]/ML
INJECTION, SOLUTION SUBCUTANEOUS AT BEDTIME
Refills: 0 | Status: ACTIVE | COMMUNITY

## 2021-06-27 NOTE — REASON FOR VISIT
[Heart Failure] : congestive heart failure [Cardiac Failure] : cardiac failure [FreeTextEntry1] : \par Cards: Callie Lopez MD

## 2021-06-27 NOTE — CARDIOLOGY SUMMARY
[de-identified] : EKG 9/2020: SR, LBBB with  ms [de-identified] : TTE 9/2020: LV 4.7 cm, LVEF 20-25% with global hypokinesis and dyskinetic septum, no significant valvular abnormalities, normal RV size/function, estimated RAP 5-8 mmHg\par \par VICKEY 2019: LVEF 25-30%, mobile densities on tricuspid valve [de-identified] : [facility] 2015: clean coronaries, LVEF 30% by ventriculogram

## 2021-06-27 NOTE — DISCUSSION/SUMMARY
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 She denies HF symptoms. Her functional capacity is limited due to gait instability/unsteadiness. No recent HF related hospitalizations or ICD shocks. Looks clinically euvolemic, warm extremities. Should continue GDMT. \par Plan as above. \par

## 2021-06-27 NOTE — HISTORY OF PRESENT ILLNESS
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  A coronary angiogram was negative for obstructive CAD and due to presence of a LBBB underwent implantation of a CRT-D. She was following with Dr. Bae. \par \par She was admitted to [facility] in 2019 for 4 months with multiorgan failure, DKA, and sepsis where she was found to have TV endocarditis. At this hospital there was concern for vasculitis for which she was treated with high dose steroids and rituximab. The ICD was extracted and she as treated with antibiotics before she was reimplanted with a single chamber ICD.\par \par Of note, during that prolonged admission she developed bilateral sensorineural hearing loss and has since been implanted with b/l cochlear implant. Incomplete insertion of electrode array noted as well as significant fibrosis during CI surgery. She has been following with audiology, in spite of making numerous changes to program her CI - unable to deliver stimulation to patient. \par \par \par She presents today for HF follow up, she last saw Dr. Bae in Feb 2021. She ambulates with a rolling walker due to instability from her hearing loss. She reports no overt heart failure symptoms, specifically denies orthopnea, PND, bendopnea, LE edema, chest discomfort, dizziness/lightheadedness, palpitations or syncope. Patient denies any recent ICD shocks. No recent hospitalizations or visits to the ED.\par \par She was recently prescribed a Ziopatch due to palpitations. She has not received the results yet. \par \par LAbs from 6/23/21 reviewed, remarkable for K 5.0, creat 1.02, \par \par

## 2021-06-27 NOTE — ASSESSMENT
[FreeTextEntry1] : # Chronic systolic heart failure ACC/AHA stage C, NYHA I\par - Etiology: unclear etiology. NICMP LVEF LVIdd 4.7cm LVEF 20-25%\par - GDMT: MEtoprolol succ 50mg daily, Entresto 49-51 mg BID, spironolactone 25 mg daily. Deferring SGLT2i given insulin dependent diabetes with prior DKA. Will keep ARNi dose as it is due to borderline hyperkalemia. Reassess at next visit.  \par - Diuretic: euvolemic off diuretics\par - Unable to participate in cardiac rehab due to instability/balance abnormality\par - Repeat labs prior to next visit\par - Can consider repeat echo after next visit\par \par # LBBB with prior CRT, now explanted for infection and reimplanted with SC-ICD\par - last interrogation 6/2021, no events with V pacing < 1%\par - Interestingly, EKG today shows narrow QRS. Prior EKG on 9/2020 showed LBBB. Rate was the same on both tracings.\par \par # Vasculitis\par - per patient, she has a diagnosis of a vasculitis and is being treated with q6 month rituximab \par - she follows with Dr. Okeefe at [facility]\par

## 2021-06-27 NOTE — PHYSICAL EXAM
[Normal] : no edema, no cyanosis, no clubbing, no varicosities [No Rash] : no rash [Alert and Oriented] : alert and oriented

## 2021-06-30 PROCEDURE — 93244 EXT ECG>48HR<7D REV&INTERPJ: CPT

## 2021-06-30 PROCEDURE — 99072 ADDL SUPL MATRL&STAF TM PHE: CPT

## 2021-08-18 ENCOUNTER — NON-APPOINTMENT (OUTPATIENT)
Age: 56
End: 2021-08-18

## 2021-08-18 ENCOUNTER — APPOINTMENT (OUTPATIENT)
Dept: CARDIOLOGY | Facility: CLINIC | Age: 56
End: 2021-08-18
Payer: COMMERCIAL

## 2021-08-18 PROCEDURE — 93295 DEV INTERROG REMOTE 1/2/MLT: CPT

## 2021-08-18 PROCEDURE — 93296 REM INTERROG EVL PM/IDS: CPT

## 2021-09-10 ENCOUNTER — APPOINTMENT (OUTPATIENT)
Dept: OTOLARYNGOLOGY | Facility: CLINIC | Age: 56
End: 2021-09-10
Payer: COMMERCIAL

## 2021-09-10 VITALS
DIASTOLIC BLOOD PRESSURE: 77 MMHG | SYSTOLIC BLOOD PRESSURE: 122 MMHG | BODY MASS INDEX: 26.68 KG/M2 | HEART RATE: 58 BPM | WEIGHT: 166 LBS | HEIGHT: 66 IN

## 2021-09-10 PROCEDURE — 99213 OFFICE O/P EST LOW 20 MIN: CPT | Mod: 25

## 2021-09-10 PROCEDURE — 69210 REMOVE IMPACTED EAR WAX UNI: CPT

## 2021-09-10 RX ORDER — ERGOCALCIFEROL 1.25 MG/1
1.25 MG CAPSULE, LIQUID FILLED ORAL
Refills: 0 | Status: DISCONTINUED | COMMUNITY
End: 2021-09-10

## 2021-09-10 RX ORDER — LINAGLIPTIN 5 MG/1
5 TABLET, FILM COATED ORAL DAILY
Refills: 0 | Status: DISCONTINUED | COMMUNITY
End: 2021-09-10

## 2021-09-10 NOTE — REASON FOR VISIT
[Subsequent Evaluation] : a subsequent evaluation for [Spouse] : spouse [FreeTextEntry2] : s/p Right cochlear implantation with mastoidectomy, tissue graft, and facial nerve monitoring, 10/12/2020.

## 2021-09-10 NOTE — PHYSICAL EXAM
[Normal] : tympanic membranes are normal in both ears [de-identified] : Bilaterial Cerumen Impaction

## 2021-09-22 ENCOUNTER — LABORATORY RESULT (OUTPATIENT)
Age: 56
End: 2021-09-22

## 2021-10-01 ENCOUNTER — APPOINTMENT (OUTPATIENT)
Dept: HEART FAILURE | Facility: CLINIC | Age: 56
End: 2021-10-01
Payer: COMMERCIAL

## 2021-10-01 VITALS
DIASTOLIC BLOOD PRESSURE: 58 MMHG | HEART RATE: 71 BPM | RESPIRATION RATE: 15 BRPM | OXYGEN SATURATION: 99 % | SYSTOLIC BLOOD PRESSURE: 98 MMHG | TEMPERATURE: 97.3 F | BODY MASS INDEX: 26.84 KG/M2 | HEIGHT: 66 IN | WEIGHT: 167 LBS

## 2021-10-01 PROCEDURE — 99214 OFFICE O/P EST MOD 30 MIN: CPT

## 2021-10-01 NOTE — ASSESSMENT
[FreeTextEntry1] : # Chronic systolic heart failure ACC/AHA stage C, NYHA I\par - Etiology: unclear etiology. NICMP LVEF LVIdd 4.7cm LVEF 20-25%\par - GDMT: MEtoprolol succ 50mg daily, Entresto 49-51 mg BID, spironolactone 25 mg daily. Deferring SGLT2i given insulin dependent diabetes with prior DKA and NYHA I. Will keep ARNi dose as it is due to borderline BP.\par - Diuretic: euvolemic off diuretics\par - Unable to participate in cardiac rehab due to instability/balance abnormality\par - ProBNP is 79\par - Will obtain echo prior to next appointment\par \par # LBBB with prior CRT, now explanted for infection and reimplanted with SC-ICD\par - last interrogation 6/2021, no events with V pacing < 1%\par - Interestingly, EKG today shows narrow QRS. Prior EKG on 9/2020 showed LBBB. Rate was the same on both tracings.\par \par # Vasculitis\par - per patient, she has a diagnosis of a vasculitis and is being treated with q6 month rituximab \par - she follows with Dr. Okeefe at [facility]\par

## 2021-10-01 NOTE — PHYSICAL EXAM
[Normal] : no edema, no cyanosis, no clubbing, no varicosities [Alert and Oriented] : alert and oriented

## 2021-10-01 NOTE — DISCUSSION/SUMMARY
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 She denies HF symptoms. Her functional capacity is limited due to gait instability/unsteadiness. No recent HF related hospitalizations or ICD shocks. Looks clinically euvolemic, warm extremities. Should continue medical therapy which seems maximized. \par Plan as above. \par

## 2021-10-01 NOTE — REASON FOR VISIT
[Cardiac Failure] : cardiac failure [Heart Failure] : congestive heart failure [Follow-Up - Clinic] : a clinic follow-up of [FreeTextEntry3] : Callie Lopez MD [FreeTextEntry1] : \par Cards: Callie Lopez MD

## 2021-10-01 NOTE — CARDIOLOGY SUMMARY
[de-identified] : EKG 9/2020: SR, LBBB with  ms [de-identified] : TTE 9/2020: LV 4.7 cm, LVEF 20-25% with global hypokinesis and dyskinetic septum, no significant valvular abnormalities, normal RV size/function, estimated RAP 5-8 mmHg\par \par VICKEY 2019: LVEF 25-30%, mobile densities on tricuspid valve [de-identified] : [facility] 2015: clean coronaries, LVEF 30% by ventriculogram

## 2021-10-01 NOTE — HISTORY OF PRESENT ILLNESS
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  A coronary angiogram was negative for obstructive CAD and due to presence of a LBBB underwent implantation of a CRT-D. She was following with Dr. Bae. \par \par She was admitted to [facility] in 2019 for 4 months with multiorgan failure, DKA, and sepsis where she was found to have TV endocarditis. At this hospital there was concern for vasculitis for which she was treated with high dose steroids and rituximab. The ICD was extracted and she as treated with antibiotics before she was reimplanted with a single chamber ICD.\par \par Of note, during that prolonged admission she developed bilateral sensorineural hearing loss and has since been implanted with b/l cochlear implant. Incomplete insertion of electrode array noted as well as significant fibrosis during CI surgery. She has been following with audiology, in spite of making numerous changes to program her CI - unable to deliver stimulation to patient. \par \par \par She presents today for routine HF follow up. She reports no overt heart failure symptoms, specifically denies orthopnea, PND, bendopnea, LE edema, chest discomfort, dizziness/lightheadedness, palpitations or syncope. Patient denies any recent ICD shocks. No recent hospitalizations or visits to the ED.\par \par Labs from 9/22/21 reviewed - ProBNP 79 !\par

## 2021-10-04 ENCOUNTER — APPOINTMENT (OUTPATIENT)
Dept: CARDIOLOGY | Facility: CLINIC | Age: 56
End: 2021-10-04
Payer: COMMERCIAL

## 2021-10-04 VITALS
BODY MASS INDEX: 27.32 KG/M2 | HEIGHT: 66 IN | OXYGEN SATURATION: 96 % | WEIGHT: 170 LBS | DIASTOLIC BLOOD PRESSURE: 62 MMHG | HEART RATE: 69 BPM | SYSTOLIC BLOOD PRESSURE: 92 MMHG

## 2021-10-04 DIAGNOSIS — Z01.818 ENCOUNTER FOR OTHER PREPROCEDURAL EXAMINATION: ICD-10-CM

## 2021-10-04 DIAGNOSIS — R00.2 PALPITATIONS: ICD-10-CM

## 2021-10-04 PROCEDURE — 99214 OFFICE O/P EST MOD 30 MIN: CPT

## 2021-10-04 NOTE — REVIEW OF SYSTEMS
[Hearing Loss] : hearing loss [SOB] : no shortness of breath [Dyspnea on exertion] : not dyspnea during exertion [Chest Discomfort] : no chest discomfort [Lower Ext Edema] : no extremity edema [Leg Claudication] : no intermittent leg claudication [Palpitations] : palpitations [Orthopnea] : no orthopnea [PND] : no PND [Syncope] : no syncope [Joint Pain] : joint pain [Joint Stiffness] : joint stiffness [Negative] : Heme/Lymph

## 2021-10-04 NOTE — HISTORY OF PRESENT ILLNESS
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  A coronary angiogram was negative for obstructive CAD and due to presence of a LBBB underwent implantation of a CRT-D. She was following with Dr. Bae. \par \par She was admitted to [place] in 2019 for 4 months with multiorgan failure, DKA, and sepsis where she was found to have TV endocarditis. At this hospital there was concern for vasculitis for which she was treated with high dose steroids and rituximab. The ICD was extracted and she as treated with antibiotics before she was reimplanted with a single chamber ICD.\par \par Of note, during that prolonged admission she developed bilateral sensorineural hearing loss and has since been implanted with b/l cochlear implant. Incomplete insertion of electrode array noted as well as significant fibrosis during CI surgery. She has been following with audiology, in spite of making numerous changes to program her CI - unable to deliver stimulation to patient. \par \par \par She presents today for routine HF follow up. She reports no overt heart failure symptoms, specifically denies orthopnea, PND, bendopnea, LE edema, chest discomfort, dizziness/lightheadedness, palpitations or syncope. Patient denies any recent ICD shocks. No recent hospitalizations or visits to the ED.\par \par \par

## 2021-10-04 NOTE — CARDIOLOGY SUMMARY
[de-identified] : EKG 9/2020: SR, LBBB with  ms\par  [de-identified] : TTE 9/2020: LV 4.7 cm, LVEF 20-25% with global hypokinesis and dyskinetic septum, no significant valvular abnormalities, normal RV size/function, estimated RAP 5-8 mmHg\par \par VICKEY 2019: LVEF 25-30%, mobile densities on tricuspid valve [de-identified] : [facility] 2015: clean coronaries, LVEF 30% by ventriculogram

## 2021-10-04 NOTE — DISCUSSION/SUMMARY
[___ Month(s)] : in [unfilled] month(s) [FreeTextEntry1] : 55 female with above medical history active medical problems as noted below\par \par #1 . chronic systolic heart failure ACC/AHA stage C, NYHA I\par - Etiology: unclear etiology. NICMP LVEF LVIdd 4.7cm LVEF 20-25%\par - GDMT: MEtoprolol succ 50mg daily, Entresto 49-51 mg BID, spironolactone 25 mg daily.  As per heart failure specialist SGLT2 inhibitor was deferred.\par Recommended to take metoprolol succinate at nighttime because of intermittent nocturnal palpitations.\par - Diuretic: euvolemic off diuretics\par - Unable to participate in cardiac rehab due to instability/balance abnormality\par - ProBNP is 79\par -Echo is being scheduled prior to next heart failure clinic appointment.\par \par # 2. LBBB with prior CRT, now explanted for infection and reimplanted with SC-ICD\par - last interrogation 6/2021, no events with V pacing < 1%\par \par \par #3.  Vasculitis\par - per patient, she has a diagnosis of a vasculitis and is being treated with q6 month rituximab \par - she follows with Dr. Okeefe at [facility]\par \par #4 . dyslipidemia.  Continue rosuvastatin.  Prescription done.\par \par Counseling regarding low saturated fat, salt and carbohydrate intake was reviewed. Active lifestyle and regular. Exercise along with weight management is advised.\par All the above were at length reviewed. Answered all the questions. Thank you very much for this kind referral. Please do not hesitate to give me a call for any question.\par Part of this transcription was done with voice recognition software and phonetically similar errors are common. I apologize for that. Please do not hesitate to call for any questions due to above.\par \par Follow-up in 6 months.\par

## 2021-10-04 NOTE — ASSESSMENT
[FreeTextEntry1] : Reviewed on October 4, 2021.\par CBC CMP September 22, 2021 reviewed.  BNP level 74.\par \par \par

## 2021-10-04 NOTE — REASON FOR VISIT
[Cardiac Failure] : cardiac failure [Hyperlipidemia] : hyperlipidemia [Hypertension] : hypertension [FreeTextEntry3] : Dr. Giles [FreeTextEntry1] : \par

## 2021-10-18 ENCOUNTER — OUTPATIENT (OUTPATIENT)
Dept: OUTPATIENT SERVICES | Facility: HOSPITAL | Age: 56
LOS: 1 days | Discharge: HOME | End: 2021-10-18

## 2021-10-18 ENCOUNTER — APPOINTMENT (OUTPATIENT)
Dept: SPEECH THERAPY | Facility: CLINIC | Age: 56
End: 2021-10-18

## 2021-10-18 DIAGNOSIS — Z86.018 PERSONAL HISTORY OF OTHER BENIGN NEOPLASM: Chronic | ICD-10-CM

## 2021-10-18 DIAGNOSIS — Z98.51 TUBAL LIGATION STATUS: Chronic | ICD-10-CM

## 2021-10-18 DIAGNOSIS — Z95.810 PRESENCE OF AUTOMATIC (IMPLANTABLE) CARDIAC DEFIBRILLATOR: Chronic | ICD-10-CM

## 2021-10-29 ENCOUNTER — APPOINTMENT (OUTPATIENT)
Dept: OTOLARYNGOLOGY | Facility: CLINIC | Age: 56
End: 2021-10-29
Payer: COMMERCIAL

## 2021-10-29 VITALS
HEIGHT: 66 IN | SYSTOLIC BLOOD PRESSURE: 118 MMHG | HEART RATE: 66 BPM | WEIGHT: 170 LBS | BODY MASS INDEX: 27.32 KG/M2 | DIASTOLIC BLOOD PRESSURE: 76 MMHG

## 2021-10-29 PROCEDURE — 99213 OFFICE O/P EST LOW 20 MIN: CPT

## 2021-10-29 RX ORDER — LINAGLIPTIN 5 MG/1
5 TABLET, FILM COATED ORAL DAILY
Refills: 0 | Status: COMPLETED | COMMUNITY
End: 2021-10-29

## 2021-10-29 NOTE — PHYSICAL EXAM
[Normal] : the left tympanic membrane was normal [de-identified] : implant processor in normal location

## 2021-10-29 NOTE — CONSULT LETTER
[FreeTextEntry1] : Dear Arturo,\par \par Hanane Fontana presents for reassessment status post right cochlear implantation.  She has had persistent difficulty with speech understanding while using the right implant, but since her most recent audiology evaluation in the past month she does report improved sound awareness and better communication abilities with combined use of the implant and her left hearing aid.  Exam again shows a normally positioned right internal processor and otherwise normal-appearing bilateral tympanic membranes bilaterally.  At this point she will continue bimodal cochlear implant/hearing aid usage, and I will see her back to monitor her left ear hearing in 6 months.\par \par Thank you once again for the opportunity to participate in your patient's care, and I will keep you informed as to her progress.\par \par Best regards,\par \par Dorian Watkins MD\par Otology/Neurotology\par [facility]\par [facility] [FreeTextEntry2] : Arturo Lema MD [FreeTextEntry3] : Dr. Ilia Watkins\par Otology/Neurotology\par [facility] \par [facility]

## 2021-10-29 NOTE — HISTORY OF PRESENT ILLNESS
[de-identified] : 55 year old female s/p Right cochlear implantation with mastoidectomy, tissue graft, and facial nerve monitoring, 10/12/2020. Patient reports hearing is stable since cochlear implant, currently wearing hearing aid on left ear. Report tinnitus in right ear when cochlear implant is in place, resolves when using left ear hearing aid. Patient denies otalgia, otorrhea, ear infections, tinnitus, dizziness, vertigo, headaches related to hearing.\par

## 2021-11-02 ENCOUNTER — NON-APPOINTMENT (OUTPATIENT)
Age: 56
End: 2021-11-02

## 2021-11-17 ENCOUNTER — NON-APPOINTMENT (OUTPATIENT)
Age: 56
End: 2021-11-17

## 2021-11-17 ENCOUNTER — APPOINTMENT (OUTPATIENT)
Dept: CARDIOLOGY | Facility: CLINIC | Age: 56
End: 2021-11-17
Payer: COMMERCIAL

## 2021-11-17 PROCEDURE — 93295 DEV INTERROG REMOTE 1/2/MLT: CPT

## 2021-11-17 PROCEDURE — 93296 REM INTERROG EVL PM/IDS: CPT | Mod: NC

## 2021-12-29 DIAGNOSIS — H90.3 SENSORINEURAL HEARING LOSS, BILATERAL: ICD-10-CM

## 2022-01-20 ENCOUNTER — APPOINTMENT (OUTPATIENT)
Dept: PHARMACY | Facility: CLINIC | Age: 57
End: 2022-01-20
Payer: SELF-PAY

## 2022-01-20 PROCEDURE — V5299I: CUSTOM | Mod: LT

## 2022-01-20 PROCEDURE — V5267B: CUSTOM | Mod: LT

## 2022-02-01 ENCOUNTER — NON-APPOINTMENT (OUTPATIENT)
Age: 57
End: 2022-02-01

## 2022-02-14 ENCOUNTER — APPOINTMENT (OUTPATIENT)
Dept: SPEECH THERAPY | Facility: CLINIC | Age: 57
End: 2022-02-14

## 2022-02-18 ENCOUNTER — APPOINTMENT (OUTPATIENT)
Dept: CARDIOLOGY | Facility: CLINIC | Age: 57
End: 2022-02-18
Payer: MEDICARE

## 2022-02-18 ENCOUNTER — NON-APPOINTMENT (OUTPATIENT)
Age: 57
End: 2022-02-18

## 2022-02-18 PROCEDURE — 93295 DEV INTERROG REMOTE 1/2/MLT: CPT

## 2022-02-18 PROCEDURE — 93296 REM INTERROG EVL PM/IDS: CPT

## 2022-04-05 ENCOUNTER — APPOINTMENT (OUTPATIENT)
Dept: PHARMACY | Facility: CLINIC | Age: 57
End: 2022-04-05
Payer: SELF-PAY

## 2022-04-05 PROCEDURE — V5299A: CUSTOM | Mod: NC,LT

## 2022-04-08 ENCOUNTER — APPOINTMENT (OUTPATIENT)
Dept: HEART FAILURE | Facility: CLINIC | Age: 57
End: 2022-04-08
Payer: MEDICARE

## 2022-04-08 ENCOUNTER — APPOINTMENT (OUTPATIENT)
Dept: CARDIOLOGY | Facility: CLINIC | Age: 57
End: 2022-04-08
Payer: MEDICARE

## 2022-04-08 VITALS
SYSTOLIC BLOOD PRESSURE: 112 MMHG | HEART RATE: 69 BPM | OXYGEN SATURATION: 99 % | BODY MASS INDEX: 26.84 KG/M2 | RESPIRATION RATE: 14 BRPM | DIASTOLIC BLOOD PRESSURE: 72 MMHG | HEIGHT: 66 IN | WEIGHT: 167 LBS | TEMPERATURE: 97.9 F

## 2022-04-08 PROCEDURE — 93306 TTE W/DOPPLER COMPLETE: CPT

## 2022-04-08 PROCEDURE — 99214 OFFICE O/P EST MOD 30 MIN: CPT

## 2022-04-08 RX ORDER — DOCUSATE SODIUM 100 MG/1
TABLET ORAL
Refills: 0 | Status: DISCONTINUED | COMMUNITY
End: 2022-04-08

## 2022-04-08 RX ORDER — MECLIZINE HYDROCHLORIDE 25 MG/1
25 TABLET ORAL
Refills: 0 | Status: DISCONTINUED | COMMUNITY
End: 2022-04-08

## 2022-04-16 NOTE — REASON FOR VISIT
[Cardiac Failure] : cardiac failure [Follow-Up - Clinic] : a clinic follow-up of [Heart Failure] : congestive heart failure [FreeTextEntry3] : Callie Lopez MD [FreeTextEntry1] : \par Cards: Callie Lopez MD

## 2022-04-16 NOTE — HISTORY OF PRESENT ILLNESS
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  A coronary angiogram was negative for obstructive CAD and due to presence of a LBBB underwent implantation of a CRT-D. She was following with Dr. Bae. \par \par She was admitted to [place] in 2019 for 4 months with multiorgan failure, DKA, and sepsis where she was found to have TV endocarditis. At this hospital there was concern for vasculitis for which she was treated with high dose steroids and rituximab. The ICD was extracted and she as treated with antibiotics before she was reimplanted with a single chamber ICD.\par \par Of note, during that prolonged admission she developed bilateral sensorineural hearing loss and has since been implanted with b/l cochlear implant. Incomplete insertion of electrode array noted as well as significant fibrosis during CI surgery. She has been following with audiology, in spite of making numerous changes to program her CI - unable to deliver stimulation to patient. \par \par \par She presents today for routine HF follow up. She reports no overt heart failure symptoms, specifically denies orthopnea, PND, bendopnea, LE edema, chest discomfort, dizziness/lightheadedness, palpitations or syncope. Patient denies any recent ICD shocks. No recent hospitalizations or visits to the ED.\par Her  reports that she had covid-19 infection in January 2022. She stayed at home due to mild symptoms. \par

## 2022-04-16 NOTE — CARDIOLOGY SUMMARY
[de-identified] : EKG 9/2020: SR, LBBB with  ms [de-identified] : 4/8/22 TTE LVEF 45% LVIDd 4.5cm, RV normal size/function, RVSP 29mmHg (RAP 3mmHg)\par \par TTE 9/2020: LV 4.7 cm, LVEF 20-25% with global hypokinesis and dyskinetic septum, no significant valvular abnormalities, normal RV size/function, estimated RAP 5-8 mmHg\par \par VICKEY 2019: LVEF 25-30%, mobile densities on tricuspid valve [de-identified] : [facility] 2015: clean coronaries, LVEF 30% by ventriculogram

## 2022-04-16 NOTE — PHYSICAL EXAM
[Normal] : soft, non-tender, no masses/organomegaly, normal bowel sounds [Moves all extremities] : moves all extremities [Alert and Oriented] : alert and oriented

## 2022-04-16 NOTE — ASSESSMENT
[FreeTextEntry1] : # Chronic systolic heart failure ACC/AHA stage C, NYHA I\par - Etiology: unclear etiology. NICMP LVEF LVIdd 4.7cm LVEF 20-25%--> repeat TTE shows improvement to 45%\par - GDMT: MEtoprolol succ 50mg daily, Entresto 49-51 mg BID, spironolactone 25 mg daily. Deferring SGLT2i given insulin dependent diabetes with prior DKA and NYHA I. \par - Diuretic: euvolemic off diuretics\par - Unable to participate in cardiac rehab due to instability/balance abnormality\par - ProBNP is 79\par \par \par # LBBB with prior CRT, now explanted for infection and reimplanted with SC-ICD\par - last interrogation 6/2021, no events with V pacing < 1%\par - Interestingly, EKG today shows narrow QRS. Prior EKG on 9/2020 showed LBBB. Rate was the same on both tracings.\par \par # Vasculitis\par - per patient, she has a diagnosis of a vasculitis and is being treated with q6 month rituximab \par - she follows with Dr. Okeefe at [facility]\par

## 2022-04-16 NOTE — DISCUSSION/SUMMARY
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 She denies HF symptoms. Her functional capacity is limited due to gait instability/unsteadiness. No recent HF related hospitalizations or ICD shocks. Looks clinically euvolemic, warm extremities. Should continue medical therapy. A repeat TTE demonstrates LVEF improvement to 45%\par Plan as above. \par

## 2022-04-19 ENCOUNTER — APPOINTMENT (OUTPATIENT)
Dept: PHARMACY | Facility: CLINIC | Age: 57
End: 2022-04-19
Payer: SELF-PAY

## 2022-04-19 PROCEDURE — V5299A: CUSTOM | Mod: NC

## 2022-04-29 ENCOUNTER — APPOINTMENT (OUTPATIENT)
Dept: PHARMACY | Facility: CLINIC | Age: 57
End: 2022-04-29
Payer: SELF-PAY

## 2022-04-29 ENCOUNTER — APPOINTMENT (OUTPATIENT)
Dept: OTOLARYNGOLOGY | Facility: CLINIC | Age: 57
End: 2022-04-29
Payer: MEDICARE

## 2022-04-29 PROCEDURE — V5299A: CUSTOM | Mod: NC

## 2022-04-29 PROCEDURE — 99214 OFFICE O/P EST MOD 30 MIN: CPT | Mod: 25

## 2022-04-29 PROCEDURE — 92567 TYMPANOMETRY: CPT

## 2022-04-29 PROCEDURE — 92557 COMPREHENSIVE HEARING TEST: CPT

## 2022-04-29 PROCEDURE — 69801 INCISE INNER EAR: CPT

## 2022-04-29 NOTE — CONSULT LETTER
[FreeTextEntry2] : Arturo Lema MD [FreeTextEntry1] : Dear Arturo,\par \par Hanane Fontana presents for follow-up for her bilateral sensorineural hearing loss.  Since her last visit in October, she describes a new onset decline in her hearing in the better hearing ear over the past 3 to 4 weeks.  Exam today shows intact bilateral tympanic membranes without effusion or retraction and normal bilateral facial function.  The right cochlear implant is normally positioned and postauricular incision is well-healed.  I personally ordered and reviewed a new audiogram for her hearing loss, which shows a decline in her known left sensorineural hearing loss to the severe to profound range.\par \par We discussed options for management of her sudden decline in hearing.  She is diabetic and not a good candidate for oral steroids.  Because of this, I did offer her an intratympanic steroid injection, which was performed today without complication.  I will see her back in 1 week with repeat audiogram for reassessment.  We discussed with her and her  the option of a cochlear implant for the left ear if her hearing does not improve with IT injection.\par \par Thank you once again for the opportunity to participate in your patient's care, and I will keep you informed as to her progress.\par \par Best regards,\par \par Dorian Watkins MD\par Otology/Neurotology\par [facility]\par [facility]\par

## 2022-04-29 NOTE — HISTORY OF PRESENT ILLNESS
[de-identified] : 56 year old female here for \par  s/p Right cochlear implantation with mastoidectomy, tissue graft, and facial nerve monitoring, 10/12/2020\par Cochlear implant on left \par

## 2022-04-29 NOTE — PROCEDURE
[FreeTextEntry3] : Procedure note: Left intratympanic steroid injection.\par \par Apr 29, 2022 \par \par Description of Operative Procedure:  Risks, benefits, and alternatives of the plan and procedure were discussed with the patient prior to proceeding.  Risks would include but are not limited to bleeding, infection, persistent pain, persistent drainage, dizziness, or failure to improve hearing.  Benefits would include improvement in hearing loss.  The patient agreed to proceed.  Topical phenol was used to anesthetize the eardrum.  Using a long #27 gauge needle, 0.6 cc of dexamethasone 10 mg/ml was injected into the middle ear space.  The patient remained in the supine position for 15 minutes and tolerated the procedure without complications.

## 2022-05-05 ENCOUNTER — APPOINTMENT (OUTPATIENT)
Dept: PHARMACY | Facility: CLINIC | Age: 57
End: 2022-05-05
Payer: SELF-PAY

## 2022-05-05 PROCEDURE — V5299A: CUSTOM | Mod: NC

## 2022-05-06 ENCOUNTER — APPOINTMENT (OUTPATIENT)
Dept: OTOLARYNGOLOGY | Facility: CLINIC | Age: 57
End: 2022-05-06

## 2022-05-09 ENCOUNTER — APPOINTMENT (OUTPATIENT)
Dept: OTOLARYNGOLOGY | Facility: CLINIC | Age: 57
End: 2022-05-09
Payer: MEDICARE

## 2022-05-09 VITALS
SYSTOLIC BLOOD PRESSURE: 116 MMHG | HEART RATE: 60 BPM | WEIGHT: 167 LBS | DIASTOLIC BLOOD PRESSURE: 73 MMHG | BODY MASS INDEX: 26.84 KG/M2 | HEIGHT: 66 IN

## 2022-05-09 PROCEDURE — 92567 TYMPANOMETRY: CPT

## 2022-05-09 PROCEDURE — 69801 INCISE INNER EAR: CPT

## 2022-05-09 PROCEDURE — 92557 COMPREHENSIVE HEARING TEST: CPT

## 2022-05-09 PROCEDURE — 99213 OFFICE O/P EST LOW 20 MIN: CPT | Mod: 25

## 2022-05-09 NOTE — DATA REVIEWED
[de-identified] : *LEFT EAR ONLY PER DR. JANG\par -TYMPS: TYPE As\par -ESSENTIALLY PROFOUND SNHL 250-8000 HZ\par RECS: 1) ENT F/U 2)RE-EVAL AS PER MD

## 2022-05-09 NOTE — PROCEDURE
[FreeTextEntry3] : Procedure note: Left intratympanic steroid injection.\par \par May 09, 2022 \par \par Description of Operative Procedure:  Risks, benefits, and alternatives of the plan and procedure were discussed with the patient prior to proceeding.  Risks would include but are not limited to bleeding, infection, persistent pain, persistent drainage, dizziness, or failure to improve hearing.  Benefits would include improvement in hearing loss.  The patient agreed to proceed.  Topical phenol was used to anesthetize the eardrum.  Using a long #27 gauge needle, 0.6 cc of dexamethasone 10 mg/ml was injected into the middle ear space.  The patient remained in the supine position for 15 minutes and tolerated the procedure without complications.

## 2022-05-10 ENCOUNTER — APPOINTMENT (OUTPATIENT)
Dept: CARDIOLOGY | Facility: CLINIC | Age: 57
End: 2022-05-10
Payer: MEDICARE

## 2022-05-10 PROCEDURE — 99214 OFFICE O/P EST MOD 30 MIN: CPT

## 2022-05-10 NOTE — CARDIOLOGY SUMMARY
[de-identified] : EKG 9/2020: SR, LBBB with  ms\par  [de-identified] : TTE 9/2020: LV 4.7 cm, LVEF 20-25% with global hypokinesis and dyskinetic septum, no significant valvular abnormalities, normal RV size/function, estimated RAP 5-8 mmHg\par \par VICKEY 2019: LVEF 25-30%, mobile densities on tricuspid valve\par \par May 10, 2022.  April 2022.  LVEF 40 to 45% modified Carroll 45% LVIDD 4.5 minimal mitral regurgitation normal left atrium RVSP 29.  Normal RV. [de-identified] : [facility] 2015: clean coronaries, LVEF 30% by ventriculogram

## 2022-05-10 NOTE — DISCUSSION/SUMMARY
[___ Month(s)] : in [unfilled] month(s) [FreeTextEntry1] : 55 female with above medical history active medical problems as noted below\par \par #1 . chronic systolic heart failure ACC/AHA stage C, NYHA I\par - Etiology: unclear etiology. NICMP LVEF 45% LVIDD 4.5.\par - GDMT: MEtoprolol succ 50mg daily, Entresto 49-51 mg BID, spironolactone 25 mg daily.  As per heart failure specialist SGLT2 inhibitor was deferred.\par Recommended to take metoprolol succinate at nighttime because of intermittent nocturnal palpitations.\par - Diuretic: euvolemic off diuretics\par - Unable to participate in cardiac rehab due to instability/balance abnormality\par - ProBNP is normal\par -Echo improved LV systolic function\par -Labs ordered.\par \par # 2. LBBB with prior CRT, now explanted for infection and reimplanted with SC-ICD\par - last interrogation February 2022, no events with V pacing < 1%\par \par \par #3.  Vasculitis\par - per patient, she has a diagnosis of a vasculitis and is being treated with q6 month rituximab \par - she follows with Dr. Okeefe at [facility]\par \par #4 . dyslipidemia.  Continue rosuvastatin.  Labs stable.  LDL goal is achieved.\par \par Counseling regarding low saturated fat, salt and carbohydrate intake was reviewed. Active lifestyle and regular. Exercise along with weight management is advised.\par All the above were at length reviewed. Answered all the questions. Thank you very much for this kind referral. Please do not hesitate to give me a call for any question.\par Part of this transcription was done with voice recognition software and phonetically similar errors are common. I apologize for that. Please do not hesitate to call for any questions due to above.\par \par Follow-up in 6 months.\par Sincerely,\par Callie Lopez MD,FACC,AMANDA\par

## 2022-05-10 NOTE — HISTORY OF PRESENT ILLNESS
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  A coronary angiogram was negative for obstructive CAD and due to presence of a LBBB underwent implantation of a CRT-D. She was following with Dr. Bae. \par \par She was admitted to [place] in 2019 for 4 months with multiorgan failure, DKA, and sepsis where she was found to have TV endocarditis. At this hospital there was concern for vasculitis for which she was treated with high dose steroids and rituximab. The ICD was extracted and she as treated with antibiotics before she was reimplanted with a single chamber ICD.\par \par Of note, during that prolonged admission she developed bilateral sensorineural hearing loss and has since been implanted with b/l cochlear implant. Incomplete insertion of electrode array noted as well as significant fibrosis during CI surgery. She has been following with audiology, in spite of making numerous changes to program her CI - unable to deliver stimulation to patient. \par \par \par She presents today for routine HF follow up. She reports no overt heart failure symptoms, specifically denies orthopnea, PND, bendopnea, LE edema, chest discomfort, dizziness/lightheadedness, palpitations or syncope. Patient denies any recent ICD shocks. No recent hospitalizations or visits to the ED.\par \par \par

## 2022-05-10 NOTE — ASSESSMENT
[FreeTextEntry1] : Reviewed on October 4, 2021.\par CBC CMP September 22, 2021 reviewed.  BNP level 74.\par Reviewed on May 10, 2022.\par \par Labs from March and February were reviewed.  CMP stable.  LDL 65 HDL 47 triglycerides 75 total cholesterol 127 hemoglobin A1c 6.3\par Echocardiogram April 8, 2022 reviewed

## 2022-05-10 NOTE — REVIEW OF SYSTEMS
[Hearing Loss] : hearing loss [Palpitations] : palpitations [Joint Pain] : joint pain [Joint Stiffness] : joint stiffness [Negative] : Heme/Lymph [SOB] : no shortness of breath [Dyspnea on exertion] : not dyspnea during exertion [Chest Discomfort] : no chest discomfort [Lower Ext Edema] : no extremity edema [Leg Claudication] : no intermittent leg claudication [Orthopnea] : no orthopnea [PND] : no PND [Syncope] : no syncope

## 2022-05-19 DIAGNOSIS — Z95.0 PRESENCE OF CARDIAC PACEMAKER: ICD-10-CM

## 2022-05-20 ENCOUNTER — APPOINTMENT (OUTPATIENT)
Dept: CARDIOLOGY | Facility: CLINIC | Age: 57
End: 2022-05-20
Payer: MEDICARE

## 2022-05-20 ENCOUNTER — NON-APPOINTMENT (OUTPATIENT)
Age: 57
End: 2022-05-20

## 2022-05-20 PROCEDURE — 93296 REM INTERROG EVL PM/IDS: CPT

## 2022-05-20 PROCEDURE — 93295 DEV INTERROG REMOTE 1/2/MLT: CPT

## 2022-06-27 ENCOUNTER — NON-APPOINTMENT (OUTPATIENT)
Age: 57
End: 2022-06-27

## 2022-07-09 ENCOUNTER — APPOINTMENT (OUTPATIENT)
Dept: CT IMAGING | Facility: CLINIC | Age: 57
End: 2022-07-09

## 2022-07-09 ENCOUNTER — RESULT REVIEW (OUTPATIENT)
Age: 57
End: 2022-07-09

## 2022-07-09 PROCEDURE — 70480 CT ORBIT/EAR/FOSSA W/O DYE: CPT | Mod: MH

## 2022-07-15 ENCOUNTER — NON-APPOINTMENT (OUTPATIENT)
Age: 57
End: 2022-07-15

## 2022-08-11 ENCOUNTER — APPOINTMENT (OUTPATIENT)
Dept: OTOLARYNGOLOGY | Facility: CLINIC | Age: 57
End: 2022-08-11

## 2022-08-11 VITALS — HEART RATE: 62 BPM | DIASTOLIC BLOOD PRESSURE: 71 MMHG | SYSTOLIC BLOOD PRESSURE: 111 MMHG

## 2022-08-11 VITALS — BODY MASS INDEX: 28.93 KG/M2 | HEIGHT: 66 IN | WEIGHT: 180 LBS

## 2022-08-11 DIAGNOSIS — R42 DIZZINESS AND GIDDINESS: ICD-10-CM

## 2022-08-11 DIAGNOSIS — H90.3 SENSORINEURAL HEARING LOSS, BILATERAL: ICD-10-CM

## 2022-08-11 DIAGNOSIS — H93.293 OTHER ABNORMAL AUDITORY PERCEPTIONS, BILATERAL: ICD-10-CM

## 2022-08-11 PROCEDURE — 99214 OFFICE O/P EST MOD 30 MIN: CPT

## 2022-08-11 NOTE — ASSESSMENT
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  Not wearing external processor in right ear.  Recently underwent CT which showed normal positioning of right CI with known incomplete insertion due to fibrosis.  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  I again had a long discussion with the patient and her  regarding the option of placement of a left cochlear implant.  I discussed the potential for  intracochlear fibrosis and limited auditory benefit of a left implant, similar to the right side, given the apparent progression of labyrinthitis ossificans in the left ear.  I also discussed that because she has increased residual hearing in the left ear relative to the right, this scarring/chronic inflammatory process may be less extensive and is a factor that would predict increased benefit.  At this point she remains undecided regarding moving forward with surgery, but should she decide to proceed will follow-up in the office for reassessment.\par \par I spent a total of 30 minutes on the date of the encounter evaluating and treating the patient.

## 2022-08-15 ENCOUNTER — APPOINTMENT (OUTPATIENT)
Dept: SPEECH THERAPY | Facility: CLINIC | Age: 57
End: 2022-08-15

## 2022-08-19 ENCOUNTER — APPOINTMENT (OUTPATIENT)
Dept: CARDIOLOGY | Facility: CLINIC | Age: 57
End: 2022-08-19

## 2022-08-19 ENCOUNTER — NON-APPOINTMENT (OUTPATIENT)
Age: 57
End: 2022-08-19

## 2022-08-19 PROCEDURE — 93296 REM INTERROG EVL PM/IDS: CPT

## 2022-08-19 PROCEDURE — 93295 DEV INTERROG REMOTE 1/2/MLT: CPT

## 2022-09-19 ENCOUNTER — APPOINTMENT (OUTPATIENT)
Dept: CARDIOLOGY | Facility: CLINIC | Age: 57
End: 2022-09-19

## 2022-09-19 ENCOUNTER — NON-APPOINTMENT (OUTPATIENT)
Age: 57
End: 2022-09-19

## 2022-09-19 VITALS
SYSTOLIC BLOOD PRESSURE: 112 MMHG | TEMPERATURE: 97.5 F | HEIGHT: 66 IN | WEIGHT: 180 LBS | OXYGEN SATURATION: 98 % | HEART RATE: 80 BPM | BODY MASS INDEX: 28.93 KG/M2 | DIASTOLIC BLOOD PRESSURE: 62 MMHG

## 2022-09-19 DIAGNOSIS — Z01.810 ENCOUNTER FOR PREPROCEDURAL CARDIOVASCULAR EXAMINATION: ICD-10-CM

## 2022-09-19 PROCEDURE — 99214 OFFICE O/P EST MOD 30 MIN: CPT

## 2022-09-19 PROCEDURE — 93000 ELECTROCARDIOGRAM COMPLETE: CPT | Mod: NC

## 2022-09-19 NOTE — HISTORY OF PRESENT ILLNESS
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  A coronary angiogram was negative for obstructive CAD and due to presence of a LBBB underwent implantation of a CRT-D. She was following with Dr. Bae. \par \par She was admitted to [place] in 2019 for 4 months with multiorgan failure, DKA, and sepsis where she was found to have TV endocarditis. At this hospital there was concern for vasculitis for which she was treated with high dose steroids and rituximab. The ICD was extracted and she as treated with antibiotics before she was reimplanted with a single chamber ICD.\par Of note, during that prolonged admission she developed bilateral sensorineural hearing loss and has since been implanted with b/l cochlear implant. Incomplete insertion of electrode array noted as well as significant fibrosis during CI surgery. She has been following with audiology, in spite of making numerous changes to program her CI - unable to deliver stimulation to patient. \par \par She is here today for preoperative cardiac assessment prior to colonoscopy.  She reports no overt heart failure symptoms, specifically denies orthopnea, PND, bendopnea, LE edema, chest discomfort, dizziness/lightheadedness, palpitations or syncope. Patient denies any recent ICD shocks. No recent hospitalizations or visits to the ED. she is using stationary bicycle.  She has stable weight.\par \par \par

## 2022-09-19 NOTE — DISCUSSION/SUMMARY
[Patient] : the patient [Risks] : risks [Benefits] : benefits [Alternatives] : alternatives [FreeTextEntry1] : 56 female with above medical history active medical problems as noted below\par \par #1 . chronic systolic heart failure ACC/AHA stage C, NYHA I\par - Etiology: unclear etiology. NICMP LVEF 45% LVIDD 4.5.\par - GDMT: MEtoprolol succ 50mg daily, Entresto 49-51 mg BID, spironolactone 25 mg daily.  SGLT2 inhibitor deferred because of lower blood pressure, recent admissions for DKA/OSCAR and use of I have reviewed above at length. I answered all the questions. Patient verbalized understanding\par Thank you very much for allowing me to participate in your patient's care. Please feel free to call me for any questions..\par - Diuretic: euvolemic off diuretics\par - Unable to participate in cardiac rehab due to instability/balance abnormality\par -Echo improved LV systolic function\par -Labs ordered.\par \par # 2. LBBB with prior CRT, now explanted for infection and reimplanted with SC-ICD\par - last interrogation February 2022, no events with V pacing < 1%\par \par \par #3.  Vasculitis\par - per patient, she has a diagnosis of a vasculitis and is being treated with q6 month rituximab \par - she follows with Dr. Okeefe at [facility]\par \par #4 . dyslipidemia.  Continue rosuvastatin.  Labs stable.  LDL goal is achieved.\par \par At present, there are no active cardiac conditions.\par No recent unstable coronary syndrome, decompensated heart failure, severe valvular heart disease or significant dysrhythmias.\par The clinical benefit of the proposed procedure outweighs the associated cardiovascular risk.\par Risk not attenuated with further cardiovascular testing.\par Prior testing as outlined above.\par Optimized from a cardiovascular perspective.\par Control blood pressure, heart rate, pulse oximetry perioperatively.\par If required appropriate intravenous medication for the outpatient oral medication for blood pressure, heart rate control.\par DVT prophylaxis as per indication.\par \par Counseling regarding low saturated fat, salt and carbohydrate intake was reviewed. Active lifestyle and regular. Exercise along with weight management is advised.\par All the above were at length reviewed. Answered all the questions. Thank you very much for this kind referral. Please do not hesitate to give me a call for any question.\par Part of this transcription was done with voice recognition software and phonetically similar errors are common. I apologize for that. Please do not hesitate to call for any questions due to above.\par \par \par Sincerely,\par Callie Lopez MD,FACC,FASE\par

## 2022-09-19 NOTE — CARDIOLOGY SUMMARY
[de-identified] : EKG 9/2020: SR, LBBB with  ms\par September 19, 2022.  Normal sinus rhythm [de-identified] : TTE 9/2020: LV 4.7 cm, LVEF 20-25% with global hypokinesis and dyskinetic septum, no significant valvular abnormalities, normal RV size/function, estimated RAP 5-8 mmHg\par \par VICKEY 2019: LVEF 25-30%, mobile densities on tricuspid valve\par \par May 10, 2022.  April 2022.  LVEF 40 to 45% modified Carroll 45% LVIDD 4.5 minimal mitral regurgitation normal left atrium RVSP 29.  Normal RV. [de-identified] : [facility] 2015: clean coronaries, LVEF 30% by ventriculogram

## 2022-09-19 NOTE — ASSESSMENT
[FreeTextEntry1] : Reviewed on October 4, 2021.\par CBC CMP September 22, 2021 reviewed.  BNP level 74.\par Reviewed on May 10, 2022.\par \par Labs from March and February were reviewed.  CMP stable.  LDL 65 HDL 47 triglycerides 75 total cholesterol 127 hemoglobin A1c 6.3\par Echocardiogram April 8, 2022 reviewed\par \par Reviewed on September 19, 2022\par EKG as noted above\par Recent labs.  August 2022.  Creatinine 1.0 potassium 4.8 sodium 143 LFT normal hemoglobin 11.1 platelet count 170 hemoglobin A1c 6.4 LDL 53 HDL 61 triglycerides 82

## 2022-09-27 ENCOUNTER — NON-APPOINTMENT (OUTPATIENT)
Age: 57
End: 2022-09-27

## 2022-11-08 ENCOUNTER — APPOINTMENT (OUTPATIENT)
Dept: CARDIOLOGY | Facility: CLINIC | Age: 57
End: 2022-11-08

## 2022-11-08 VITALS
OXYGEN SATURATION: 100 % | WEIGHT: 183 LBS | HEIGHT: 66 IN | HEART RATE: 83 BPM | DIASTOLIC BLOOD PRESSURE: 56 MMHG | TEMPERATURE: 96.3 F | BODY MASS INDEX: 29.41 KG/M2 | SYSTOLIC BLOOD PRESSURE: 100 MMHG

## 2022-11-08 PROCEDURE — 93282 PRGRMG EVAL IMPLANTABLE DFB: CPT

## 2022-11-08 NOTE — PROCEDURE
[No] : not [See Device Printout] : See device printout [ICD] : Implantable cardioverter-defibrillator [VVI] : VVI [Lead Imp:  ___ohms] : lead impedance was [unfilled] ohms [Sensing Amplitude ___mv] : sensing amplitude was [unfilled] mv [___V @] : [unfilled] V [___ ms] : [unfilled] ms [None] : none [Counters Reset] : the counters were reset [Pace ___ %] : Pace [unfilled]% [de-identified] : MANNY [de-identified] : Fortify Assura [de-identified] : [id number] [de-identified] : 2/13/2020 [de-identified] : 40 [de-identified] : 7.3 years [de-identified] : \par AICD with normal sensing, capture, battery\par \par No events noted on today's interrogation\par Of note, she underwent Cochlear implant which was unsuccessful d/t too much "fibrosis". Pt accompanied by her  at office visits. \par \par Sincerely,\par \par Madeleine Aviles PA-C\par Patients history, testing and plan reviewed with supervising MD: Dr. Callie Lopez

## 2022-11-18 ENCOUNTER — APPOINTMENT (OUTPATIENT)
Dept: HEART FAILURE | Facility: CLINIC | Age: 57
End: 2022-11-18

## 2022-11-18 ENCOUNTER — APPOINTMENT (OUTPATIENT)
Dept: CARDIOLOGY | Facility: CLINIC | Age: 57
End: 2022-11-18

## 2022-11-18 VITALS
HEIGHT: 66 IN | OXYGEN SATURATION: 100 % | WEIGHT: 182 LBS | TEMPERATURE: 96.8 F | SYSTOLIC BLOOD PRESSURE: 100 MMHG | HEART RATE: 73 BPM | BODY MASS INDEX: 29.25 KG/M2 | DIASTOLIC BLOOD PRESSURE: 60 MMHG

## 2022-11-18 PROCEDURE — 99214 OFFICE O/P EST MOD 30 MIN: CPT

## 2022-11-18 NOTE — HISTORY OF PRESENT ILLNESS
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  A coronary angiogram was negative for obstructive CAD and due to presence of a LBBB underwent implantation of a CRT-D. She was following with Dr. Bae. \par \par She was admitted to [facility] in 2019 for 4 months with multiorgan failure, DKA, and sepsis where she was found to have TV endocarditis. At this hospital there was concern for vasculitis for which she was treated with high dose steroids and rituximab. The ICD was extracted and she as treated with antibiotics before she was reimplanted with a single chamber ICD.\par \par Of note, during that prolonged admission she developed bilateral sensorineural hearing loss and has since been implanted with b/l cochlear implant. Incomplete insertion of electrode array noted as well as significant fibrosis during CI surgery. She has been following with audiology, in spite of making numerous changes to program her CI - unable to deliver stimulation to patient. \par \par \par She presents today for routine HF follow up. She states she feels well. She reports no overt heart failure symptoms, specifically denies orthopnea, PND, bendopnea, LE edema, chest discomfort, dizziness/lightheadedness, palpitations or syncope. Patient denies any recent ICD shocks. No recent hospitalizations or visits to the ED.\par She is exercising at home every day w/o any issues.  \par

## 2022-11-18 NOTE — ASSESSMENT
[FreeTextEntry1] : # Chronic systolic heart failure ACC/AHA stage C, NYHA I\par - Etiology: unclear etiology. NICMP LVEF LVIdd 4.7cm LVEF 20-25%--> repeat TTE shows improvement to 45%\par - GDMT: MEtoprolol succ 50mg daily, Entresto 49-51 mg BID, spironolactone 25 mg daily. Deferring SGLT2i given insulin dependent diabetes with prior DKA and NYHA I. \par - Diuretic: euvolemic off diuretics\par - HF education provided including lifestyle changes (low Na diet, fluid restriction, increase physical activity), current clinical condition, natural progression and prognosis.\par - Continue shared care with Dr. Lopez. We will see her again in 1 year. \par \par \par # LBBB with prior CRT, now explanted for infection and reimplanted with SC-ICD\par - last interrogation 6/2021, no events with V pacing < 1%\par - Interestingly, EKG shows narrow QRS. Prior EKG on 9/2020 showed LBBB. Rate was the same on both tracings.\par \par # Vasculitis\par - per patient, she has a diagnosis of a vasculitis and is being treated with q6 month rituximab \par - she follows with Dr. Okeefe at [facility]\par

## 2022-11-18 NOTE — DISCUSSION/SUMMARY
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 She denies HF symptoms. No recent HF related hospitalizations or ICD shocks. Looks clinically euvolemic, warm extremities. Should continue medical therapy. A repeat TTE demonstrated LVEF improvement to 45%.\par Plan as above. \par

## 2022-11-18 NOTE — PHYSICAL EXAM
[Normal] : soft, non-tender, no masses/organomegaly, normal bowel sounds [No Edema] : no edema [No Rash] : no rash [Alert and Oriented] : alert and oriented

## 2022-11-18 NOTE — CARDIOLOGY SUMMARY
[de-identified] : EKG 9/2020: SR, LBBB with  ms [de-identified] : 4/8/22 TTE LVEF 45% LVIDd 4.5cm, RV normal size/function, RVSP 29mmHg (RAP 3mmHg)\par \par TTE 9/2020: LV 4.7 cm, LVEF 20-25% with global hypokinesis and dyskinetic septum, no significant valvular abnormalities, normal RV size/function, estimated RAP 5-8 mmHg\par \par VICKEY 2019: LVEF 25-30%, mobile densities on tricuspid valve [de-identified] : [facility] 2015: clean coronaries, LVEF 30% by ventriculogram

## 2022-12-01 ENCOUNTER — TRANSCRIPTION ENCOUNTER (OUTPATIENT)
Age: 57
End: 2022-12-01

## 2023-02-03 ENCOUNTER — NON-APPOINTMENT (OUTPATIENT)
Age: 58
End: 2023-02-03

## 2023-02-08 ENCOUNTER — APPOINTMENT (OUTPATIENT)
Dept: CARDIOLOGY | Facility: CLINIC | Age: 58
End: 2023-02-08
Payer: MEDICARE

## 2023-02-08 ENCOUNTER — NON-APPOINTMENT (OUTPATIENT)
Age: 58
End: 2023-02-08

## 2023-02-08 PROCEDURE — 93295 DEV INTERROG REMOTE 1/2/MLT: CPT

## 2023-02-08 PROCEDURE — 93296 REM INTERROG EVL PM/IDS: CPT

## 2023-02-26 ENCOUNTER — TRANSCRIPTION ENCOUNTER (OUTPATIENT)
Age: 58
End: 2023-02-26

## 2023-04-03 ENCOUNTER — TRANSCRIPTION ENCOUNTER (OUTPATIENT)
Age: 58
End: 2023-04-03

## 2023-05-02 ENCOUNTER — NON-APPOINTMENT (OUTPATIENT)
Age: 58
End: 2023-05-02

## 2023-05-16 ENCOUNTER — NON-APPOINTMENT (OUTPATIENT)
Age: 58
End: 2023-05-16

## 2023-05-16 NOTE — PHYSICAL EXAM
[Normal] : soft, non-tender, no masses/organomegaly, normal bowel sounds [Alert and Oriented] : alert and oriented

## 2023-05-17 ENCOUNTER — APPOINTMENT (OUTPATIENT)
Dept: CARDIOLOGY | Facility: CLINIC | Age: 58
End: 2023-05-17
Payer: MEDICARE

## 2023-05-17 VITALS
SYSTOLIC BLOOD PRESSURE: 114 MMHG | OXYGEN SATURATION: 98 % | BODY MASS INDEX: 29.73 KG/M2 | HEIGHT: 66 IN | HEART RATE: 68 BPM | DIASTOLIC BLOOD PRESSURE: 62 MMHG | WEIGHT: 185 LBS

## 2023-05-17 DIAGNOSIS — I42.9 CARDIOMYOPATHY, UNSPECIFIED: ICD-10-CM

## 2023-05-17 PROCEDURE — 99214 OFFICE O/P EST MOD 30 MIN: CPT

## 2023-05-17 PROCEDURE — 93282 PRGRMG EVAL IMPLANTABLE DFB: CPT

## 2023-05-17 NOTE — PROCEDURE
[No] : not [See Device Printout] : See device printout [ICD] : Implantable cardioverter-defibrillator [VVI] : VVI [Lead Imp:  ___ohms] : lead impedance was [unfilled] ohms [Sensing Amplitude ___mv] : sensing amplitude was [unfilled] mv [___V @] : [unfilled] V [___ ms] : [unfilled] ms [None] : none [Counters Reset] : the counters were reset [Pace ___ %] : Pace [unfilled]% [de-identified] : MANNY [de-identified] : Fortify Assura [de-identified] : [id number] [de-identified] : 2/13/2020 [de-identified] : 40 [de-identified] : 6.9 years [de-identified] : \par AICD with normal sensing, capture, battery\par \par No events noted on today's interrogation\par Of note, she underwent Cochlear implant which was unsuccessful d/t too much "fibrosis". Pt accompanied by her  at office visits. \par \par Sincerely,\par \par Madeleine Aviles PA-C\par Patients history, testing and plan reviewed with supervising MD: Dr. Callie Lopez

## 2023-05-17 NOTE — DISCUSSION/SUMMARY
[Patient] : the patient [Risks] : risks [Benefits] : benefits [Alternatives] : alternatives [FreeTextEntry1] : 57 female with above medical history active medical problems as noted below\par \par #1 . chronic systolic heart failure ACC/AHA stage C, NYHA I\par - Etiology: unclear etiology. NICMP LVEF 45% LVIDD 4.5.\par - GDMT: MEtoprolol succ 50mg daily, Entresto 49-51 mg BID, spironolactone 25 mg daily.  We can consider SGLT2 inhibitor considering blood pressure is relatively stable though on the lower side as long as it is okay from endocrinology point of view.  No further episodes of DKA or the diabetic complications.\par Thank you very much for allowing me to participate in your patient's care. Please feel free to call me for any questions..\par - Diuretic: euvolemic off diuretics\par - Unable to participate in cardiac rehab due to instability/balance abnormality\par -Echo improved LV systolic function.  Repeat echocardiogram ordered.\par -Labs ordered.\par \par # 2. LBBB with prior CRT, now explanted for infection and reimplanted with SC-ICD\par - last interrogation February 2022, no events with V pacing < 1%\par \par #3.  Vasculitis\par - per patient, she has a diagnosis of a vasculitis and is being treated with q6 month rituximab \par - she follows with Dr. Okeefe at [facility]\par \par #4 . dyslipidemia.  Continue rosuvastatin.  Labs stable.  LDL goal is achieved.\par \par Counseling regarding low saturated fat, salt and carbohydrate intake was reviewed. Active lifestyle and regular. Exercise along with weight management is advised.\par All the above were at length reviewed. Answered all the questions. Thank you very much for this kind referral. Please do not hesitate to give me a call for any question.\par Part of this transcription was done with voice recognition software and phonetically similar errors are common. I apologize for that. Please do not hesitate to call for any questions due to above.\par \par \par Sincerely,\par Callie Lopez MD,FACC,AMANDA\par

## 2023-05-17 NOTE — CARDIOLOGY SUMMARY
[de-identified] : EKG 9/2020: SR, LBBB with  ms\par September 19, 2022.  Normal sinus rhythm [de-identified] : TTE 9/2020: LV 4.7 cm, LVEF 20-25% with global hypokinesis and dyskinetic septum, no significant valvular abnormalities, normal RV size/function, estimated RAP 5-8 mmHg\par \par VICKEY 2019: LVEF 25-30%, mobile densities on tricuspid valve\par \par May 10, 2022.  April 2022.  LVEF 40 to 45% modified Carroll 45% LVIDD 4.5 minimal mitral regurgitation normal left atrium RVSP 29.  Normal RV. [de-identified] : [facility] 2015: clean coronaries, LVEF 30% by ventriculogram

## 2023-05-17 NOTE — HISTORY OF PRESENT ILLNESS
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  A coronary angiogram was negative for obstructive CAD and due to presence of a LBBB underwent implantation of a CRT-D. She was following with Dr. Bae. 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 At this hospital there was concern for vasculitis for which she was treated with high dose steroids and rituximab. The ICD was extracted and she as treated with antibiotics before she was reimplanted with a single chamber ICD.\par Of note, during that prolonged admission she developed bilateral sensorineural hearing loss and has since been implanted with b/l cochlear implant. Incomplete insertion of electrode array noted as well as significant fibrosis during CI surgery. She has been following with audiology, in spite of making numerous changes to program her CI - unable to deliver stimulation to patient. \par \par She is here today for clinical follow-up.  Reviewed blood pressure and heart rate.  She reports no overt heart failure symptoms, specifically denies orthopnea, PND, bendopnea, LE edema, chest discomfort, dizziness/lightheadedness, palpitations or syncope. Patient denies any recent ICD shocks. No recent hospitalizations or visits to the ED. she is using stationary bicycle.  She has stable weight.\par \par \par

## 2023-05-17 NOTE — ASSESSMENT
[FreeTextEntry1] : Reviewed on October 4, 2021.\par CBC CMP September 22, 2021 reviewed.  BNP level 74.\par Reviewed on May 10, 2022.\par \par Labs from March and February were reviewed.  CMP stable.  LDL 65 HDL 47 triglycerides 75 total cholesterol 127 hemoglobin A1c 6.3\par Echocardiogram April 8, 2022 reviewed\par \par Reviewed on September 19, 2022\par EKG as noted above\par Recent labs.  August 2022.  Creatinine 1.0 potassium 4.8 sodium 143 LFT normal hemoglobin 11.1 platelet count 170 hemoglobin A1c 6.4 LDL 53 HDL 61 triglycerides 82\par \par Reviewed on May 17, 2023.\par Labs from May 1, 2023 reviewed which showed creatinine 1.09 potassium 4.7 sodium 140 LFT normal N-terminal proBNP 77

## 2023-06-19 NOTE — HISTORY OF PRESENT ILLNESS
Patient returned call and verbalized understanding of results. Patient was transferred to PSR to schedule labs. HFP ordered for 6 weeks.    [de-identified] : 55 year old female follow up s/p Right cochlear implantation with mastoidectomy, tissue graft, and facial nerve monitoring, 10/12/2020. States hearing has slightly decreased since the last visit. Reports use of left ear hearing aid. Patient denies otalgia, otorrhea, recent ear infections, tinnitus, dizziness, vertigo, headaches related to hearing.\par

## 2023-08-02 ENCOUNTER — APPOINTMENT (OUTPATIENT)
Dept: CARDIOLOGY | Facility: CLINIC | Age: 58
End: 2023-08-02
Payer: MEDICARE

## 2023-08-02 ENCOUNTER — NON-APPOINTMENT (OUTPATIENT)
Age: 58
End: 2023-08-02

## 2023-08-02 PROCEDURE — 93295 DEV INTERROG REMOTE 1/2/MLT: CPT

## 2023-08-02 PROCEDURE — 93296 REM INTERROG EVL PM/IDS: CPT

## 2023-08-16 ENCOUNTER — APPOINTMENT (OUTPATIENT)
Dept: CARDIOLOGY | Facility: CLINIC | Age: 58
End: 2023-08-16
Payer: MEDICARE

## 2023-08-16 VITALS
SYSTOLIC BLOOD PRESSURE: 88 MMHG | HEART RATE: 67 BPM | WEIGHT: 180 LBS | OXYGEN SATURATION: 99 % | BODY MASS INDEX: 29.05 KG/M2 | DIASTOLIC BLOOD PRESSURE: 54 MMHG

## 2023-08-16 PROCEDURE — 93306 TTE W/DOPPLER COMPLETE: CPT

## 2023-08-16 PROCEDURE — 99214 OFFICE O/P EST MOD 30 MIN: CPT

## 2023-08-16 RX ORDER — ROSUVASTATIN CALCIUM 10 MG/1
10 TABLET, FILM COATED ORAL DAILY
Qty: 90 | Refills: 3 | Status: ACTIVE | COMMUNITY
Start: 1900-01-01 | End: 1900-01-01

## 2023-08-16 RX ORDER — METOPROLOL SUCCINATE 50 MG/1
50 TABLET, EXTENDED RELEASE ORAL DAILY
Qty: 90 | Refills: 3 | Status: ACTIVE | COMMUNITY
Start: 1900-01-01 | End: 1900-01-01

## 2023-08-16 NOTE — HISTORY OF PRESENT ILLNESS
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  Echocardiogram and labs were reviewed. She was diagnosed with heart failure in 2015 where she was found to have LVEF of approximately 30%, she was apparently asymptomatic at this time.  A coronary angiogram was negative for obstructive CAD and due to presence of a LBBB underwent implantation of a CRT-D. She was following with Dr. Bae.   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 At this hospital there was concern for vasculitis for which she was treated with high dose steroids and rituximab. The ICD was extracted and she as treated with antibiotics before she was reimplanted with a single chamber ICD. Of note, during that prolonged admission she developed bilateral sensorineural hearing loss and has since been implanted with b/l cochlear implant. Incomplete insertion of electrode array noted as well as significant fibrosis during CI surgery. She has been following with audiology, in spite of making numerous changes to program her CI - unable to deliver stimulation to patient.   She is here today for clinical follow-up to review echocardiogram.  Reviewed blood pressure and heart rate.  She reports no overt heart failure symptoms, specifically denies orthopnea, PND, bendopnea, LE edema, chest discomfort, dizziness/lightheadedness, palpitations or syncope. Patient denies any recent ICD shocks. No recent hospitalizations or visits to the ED. she is using stationary bicycle.  She has stable weight.

## 2023-08-16 NOTE — CARDIOLOGY SUMMARY
[de-identified] : EKG 9/2020: SR, LBBB with  ms\par  September 19, 2022.  Normal sinus rhythm [de-identified] : TTE 9/2020: LV 4.7 cm, LVEF 20-25% with global hypokinesis and dyskinetic septum, no significant valvular abnormalities, normal RV size/function, estimated RAP 5-8 mmHg August 16 2023.  LVEF 50 to 55%.  VICKEY 2019: LVEF 25-30%, mobile densities on tricuspid valve  May 10, 2022.  April 2022.  LVEF 40 to 45% modified Carroll 45% LVIDD 4.5 minimal mitral regurgitation normal left atrium RVSP 29.  Normal RV. [de-identified] : [facility] 2015: clean coronaries, LVEF 30% by ventriculogram

## 2023-08-16 NOTE — DISCUSSION/SUMMARY
[Patient] : the patient [Risks] : risks [Benefits] : benefits [Alternatives] : alternatives [FreeTextEntry1] : 57 female with above medical history active medical problems as noted below  #1 . chronic systolic heart failure ACC/AHA stage C, NYHA I - Etiology: unclear etiology. NICMP LVEF 45% LVIDD 4.5.  Now LV ejection fraction improved to 50 to 55% with normal LVIDD.  Class I-II.  Stage C. - GDMT: MEtoprolol succ 50mg daily, Entresto 49-51 mg BID, spironolactone 25 mg daily. farxiga  10 mg once daily. No further episodes of DKA or the diabetic complications. - Diuretic: euvolemic off diuretics - Unable to participate in cardiac rehab due to instability/balance abnormality -Labs ordered.  # 2. LBBB with prior CRT, now explanted for infection and reimplanted with SC-ICD -   Recent interrogation reviewed., no events with V pacing < 1%  #3.  Vasculitis - per patient, she has a diagnosis of a vasculitis and is being treated with q6 month rituximab  - she follows with Dr. Okeefe at [facility]  #4 . dyslipidemia.  Continue rosuvastatin.  Labs stable.  LDL goal is achieved.  Counseling regarding low saturated fat, salt and carbohydrate intake was reviewed. Active lifestyle and regular. Exercise along with weight management is advised. All the above were at length reviewed. Answered all the questions. Thank you very much for this kind referral. Please do not hesitate to give me a call for any question. Part of this transcription was done with voice recognition software and phonetically similar errors are common. I apologize for that. Please do not hesitate to call for any questions due to above.   Sincerely, Callie Lopez MD,FACC,AMANDA

## 2023-08-16 NOTE — ASSESSMENT
[FreeTextEntry1] : Reviewed on October 4, 2021. CBC CMP September 22, 2021 reviewed.  BNP level 74. Reviewed on May 10, 2022.  Labs from March and February were reviewed.  CMP stable.  LDL 65 HDL 47 triglycerides 75 total cholesterol 127 hemoglobin A1c 6.3 Echocardiogram April 8, 2022 reviewed  Reviewed on September 19, 2022 EKG as noted above Recent labs.  August 2022.  Creatinine 1.0 potassium 4.8 sodium 143 LFT normal hemoglobin 11.1 platelet count 170 hemoglobin A1c 6.4 LDL 53 HDL 61 triglycerides 82  Reviewed on May 17, 2023. Labs from May 1, 2023 reviewed which showed creatinine 1.09 potassium 4.7 sodium 140 LFT normal N-terminal proBNP 77  Reviewed on August 16, 2023. Echocardiogram from today reviewed.

## 2023-11-01 ENCOUNTER — NON-APPOINTMENT (OUTPATIENT)
Age: 58
End: 2023-11-01

## 2023-11-01 ENCOUNTER — APPOINTMENT (OUTPATIENT)
Dept: CARDIOLOGY | Facility: CLINIC | Age: 58
End: 2023-11-01
Payer: MEDICARE

## 2023-11-01 PROCEDURE — 93295 DEV INTERROG REMOTE 1/2/MLT: CPT

## 2023-11-01 PROCEDURE — 93296 REM INTERROG EVL PM/IDS: CPT

## 2023-11-07 ENCOUNTER — APPOINTMENT (OUTPATIENT)
Dept: HEART FAILURE | Facility: CLINIC | Age: 58
End: 2023-11-07
Payer: MEDICARE

## 2023-11-07 VITALS
WEIGHT: 180 LBS | HEART RATE: 66 BPM | OXYGEN SATURATION: 99 % | SYSTOLIC BLOOD PRESSURE: 102 MMHG | DIASTOLIC BLOOD PRESSURE: 66 MMHG | BODY MASS INDEX: 28.93 KG/M2 | HEIGHT: 66 IN

## 2023-11-07 DIAGNOSIS — Z95.810 PRESENCE OF AUTOMATIC (IMPLANTABLE) CARDIAC DEFIBRILLATOR: ICD-10-CM

## 2023-11-07 DIAGNOSIS — Z86.39 PERSONAL HISTORY OF OTHER ENDOCRINE, NUTRITIONAL AND METABOLIC DISEASE: ICD-10-CM

## 2023-11-07 PROCEDURE — 99213 OFFICE O/P EST LOW 20 MIN: CPT

## 2023-11-07 RX ORDER — DAPAGLIFLOZIN 10 MG/1
10 TABLET, FILM COATED ORAL DAILY
Refills: 0 | Status: ACTIVE | COMMUNITY

## 2023-11-29 ENCOUNTER — TRANSCRIPTION ENCOUNTER (OUTPATIENT)
Age: 58
End: 2023-11-29

## 2024-01-16 RX ORDER — SACUBITRIL AND VALSARTAN 49; 51 MG/1; MG/1
49-51 TABLET, FILM COATED ORAL
Qty: 180 | Refills: 1 | Status: ACTIVE | COMMUNITY
Start: 2020-08-12 | End: 1900-01-01

## 2024-01-29 ENCOUNTER — TRANSCRIPTION ENCOUNTER (OUTPATIENT)
Age: 59
End: 2024-01-29

## 2024-01-31 ENCOUNTER — APPOINTMENT (OUTPATIENT)
Dept: CARDIOLOGY | Facility: CLINIC | Age: 59
End: 2024-01-31
Payer: MEDICARE

## 2024-01-31 ENCOUNTER — NON-APPOINTMENT (OUTPATIENT)
Age: 59
End: 2024-01-31

## 2024-01-31 PROCEDURE — 93295 DEV INTERROG REMOTE 1/2/MLT: CPT

## 2024-01-31 PROCEDURE — 93296 REM INTERROG EVL PM/IDS: CPT

## 2024-02-19 PROBLEM — Z95.810 AICD (AUTOMATIC CARDIOVERTER/DEFIBRILLATOR) PRESENT: Status: ACTIVE | Noted: 2017-12-29

## 2024-02-19 PROBLEM — Z86.39 HISTORY OF TYPE 2 DIABETES MELLITUS: Status: ACTIVE | Noted: 2017-12-29

## 2024-02-19 NOTE — ASSESSMENT
[FreeTextEntry1] : # Chronic systolic heart failure ACC/AHA stage C, NYHA I - Etiology: unclear etiology. NICMP LVEF LVIdd 4.7cm LVEF 20-25%--> repeat TTE shows improvement to 55% - GDMT: MEtoprolol succ 50mg daily, Entresto 49-51 mg BID, spironolactone 25 mg daily and Farxiga 10mg daily - Diuretic: euvolemic off diuretics - HF education provided including lifestyle changes (low Na diet, fluid restriction, increase physical activity), current clinical condition, natural progression and prognosis. - Continue shared care with Dr. Lopez. [facility] will follow up w Dr. Felicia Arellano in [place]   # LBBB with prior CRT, now explanted for infection and reimplanted with SC-ICD - last interrogation 6/2021, no events with V pacing < 1% - Interestingly, EKG shows narrow QRS. Prior EKG on 9/2020 showed LBBB. Rate was the same on both tracings.  # Vasculitis - per patient, she has a diagnosis of a vasculitis and is being treated with q6 month rituximab - she follows with Dr. Okeefe at [facility] .
2017 22:00

## 2024-02-19 NOTE — DISCUSSION/SUMMARY
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 She denies HF symptoms. No recent HF related hospitalizations or ICD shocks. Looks clinically euvolemic, warm extremities. Should continue medical therapy. A repeat TTE demonstrated LVEF improvement to 55%. Plan as above.

## 2024-02-19 NOTE — HISTORY OF PRESENT ILLNESS
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  She was diagnosed with heart failure in 2015 where she was found to have LVEF of approximately 30%, she was apparently asymptomatic at this time.  A coronary angiogram was negative for obstructive CAD and due to presence of a LBBB underwent implantation of a CRT-D. She was following with Dr. Bae.   She was admitted to [facility] in 2019 for 4 months with multiorgan failure, DKA, and sepsis where she was found to have TV endocarditis. At this hospital there was concern for vasculitis for which she was treated with high dose steroids and rituximab. The ICD was extracted and she as treated with antibiotics before she was reimplanted with a single chamber ICD.  Of note, during that prolonged admission she developed bilateral sensorineural hearing loss and has since been implanted with b/l cochlear implant. Incomplete insertion of electrode array noted as well as significant fibrosis during CI surgery. She has been following with audiology, in spite of making numerous changes to program her CI - unable to deliver stimulation to patient.   She presents today for routine HF follow up. She states she feels well. She reports no overt heart failure symptoms, specifically denies orthopnea, PND, bendopnea, LE edema, chest discomfort, dizziness/lightheadedness, palpitations or syncope. Patient denies any recent ICD shocks. No recent hospitalizations or visits to the ED.

## 2024-02-19 NOTE — CARDIOLOGY SUMMARY
[de-identified] : EKG 9/2020: SR, LBBB with  ms [de-identified] : 8/2023 TTE: LVEF 55%, normal LV diastolic function, trace MR, mild TR.   4/8/22 TTE LVEF 45% LVIDd 4.5cm, RV normal size/function, RVSP 29mmHg (RAP 3mmHg)  TTE 9/2020: LV 4.7 cm, LVEF 20-25% with global hypokinesis and dyskinetic septum, no significant valvular abnormalities, normal RV size/function, estimated RAP 5-8 mmHg  VICKEY 2019: LVEF 25-30%, mobile densities on tricuspid valve [de-identified] : [facility] 2015: clean coronaries, LVEF 30% by ventriculogram

## 2024-02-20 ENCOUNTER — APPOINTMENT (OUTPATIENT)
Dept: CARDIOLOGY | Facility: CLINIC | Age: 59
End: 2024-02-20
Payer: MEDICARE

## 2024-02-20 VITALS
WEIGHT: 177 LBS | DIASTOLIC BLOOD PRESSURE: 58 MMHG | SYSTOLIC BLOOD PRESSURE: 102 MMHG | BODY MASS INDEX: 28.57 KG/M2 | OXYGEN SATURATION: 99 % | HEART RATE: 88 BPM

## 2024-02-20 DIAGNOSIS — I42.8 OTHER CARDIOMYOPATHIES: ICD-10-CM

## 2024-02-20 DIAGNOSIS — I50.22 CHRONIC SYSTOLIC (CONGESTIVE) HEART FAILURE: ICD-10-CM

## 2024-02-20 DIAGNOSIS — I10 ESSENTIAL (PRIMARY) HYPERTENSION: ICD-10-CM

## 2024-02-20 DIAGNOSIS — E78.5 HYPERLIPIDEMIA, UNSPECIFIED: ICD-10-CM

## 2024-02-20 PROCEDURE — G2211 COMPLEX E/M VISIT ADD ON: CPT

## 2024-02-20 PROCEDURE — 99214 OFFICE O/P EST MOD 30 MIN: CPT

## 2024-02-20 PROCEDURE — 93000 ELECTROCARDIOGRAM COMPLETE: CPT

## 2024-02-20 RX ORDER — LEVOTHYROXINE SODIUM 0.03 MG/1
25 TABLET ORAL DAILY
Refills: 0 | Status: ACTIVE | COMMUNITY

## 2024-02-20 NOTE — HISTORY OF PRESENT ILLNESS
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  Echocardiogram and labs were reviewed. She was diagnosed with heart failure in 2015 where she was found to have LVEF of approximately 30%, she was apparently asymptomatic at this time.  A coronary angiogram was negative for obstructive CAD and due to presence of a LBBB underwent implantation of a CRT-D. She was following with Dr. Bae.   She was admitted to [place] in 2019 for 4 months with multiorgan failure, DKA, and sepsis where she was found to have TV endocarditis. At this hospital there was concern for vasculitis for which she was treated with high dose steroids and rituximab. The ICD was extracted and she as treated with antibiotics before she was reimplanted with a single chamber ICD. Of note, during that prolonged admission she developed bilateral sensorineural hearing loss and has since been implanted with b/l cochlear implant. Incomplete insertion of electrode array noted as well as significant fibrosis during CI surgery. She has been following with audiology, in spite of making numerous changes to program her CI - unable to deliver stimulation to patient.   She is here today for clinical follow-up to review echocardiogram.  Reviewed blood pressure and heart rate.  She reports no overt heart failure symptoms, specifically denies orthopnea, PND, bendopnea, LE edema, chest discomfort, dizziness/lightheadedness, palpitations or syncope. Patient denies any recent ICD shocks. No recent hospitalizations or visits to the ED. she is using stationary bicycle.  She has stable weight. Diagnosed to have hypothyroidism now on Synthroid replacement.

## 2024-02-20 NOTE — ASSESSMENT
[FreeTextEntry1] : Reviewed on October 4, 2021. CBC CMP September 22, 2021 reviewed.  BNP level 74. Reviewed on May 10, 2022.  Labs from March and February were reviewed.  CMP stable.  LDL 65 HDL 47 triglycerides 75 total cholesterol 127 hemoglobin A1c 6.3 Echocardiogram April 8, 2022 reviewed  Reviewed on September 19, 2022 EKG as noted above Recent labs.  August 2022.  Creatinine 1.0 potassium 4.8 sodium 143 LFT normal hemoglobin 11.1 platelet count 170 hemoglobin A1c 6.4 LDL 53 HDL 61 triglycerides 82  Reviewed on May 17, 2023. Labs from May 1, 2023 reviewed which showed creatinine 1.09 potassium 4.7 sodium 140 LFT normal N-terminal proBNP 77  Reviewed on August 16, 2023. Echocardiogram from today reviewed.  Reviewed on February 20, 2024.  EKG reviewed.  Most recent labs February 5, 2024 CMP stable  HDL 56 N-terminal proBNP 121. AICD check January 2024 reviewed.

## 2024-02-20 NOTE — DISCUSSION/SUMMARY
[Patient] : the patient [Risks] : risks [Benefits] : benefits [Alternatives] : alternatives [FreeTextEntry1] : 58 female with above medical history active medical problems as noted below  #1 . chronic systolic heart failure ACC/AHA stage C, NYHA I - Etiology: unclear etiology. NICMP LVEF 45% LVIDD 4.5.  Now LV ejection fraction improved to 50 to 55% with normal LVIDD.  Class I-II.  Stage C.  Stable N-terminal proBNP - GDMT: MEtoprolol succ 50mg daily, Entresto 49-51 mg BID, spironolactone 25 mg daily. farxiga  10 mg once daily. No further episodes of DKA or the diabetic complications. - Diuretic: euvolemic off diuretics - Unable to participate in cardiac rehab due to instability/balance abnormality -Labs ordered.  Recheck echocardiogram for LV ejection fraction dimension valvular regurgitation pulmonary pressure.  # 2. LBBB with prior CRT, now explanted for infection and reimplanted with SC-ICD at present sinus bradycardia. -   Recent interrogation reviewed., no events with V pacing < 1%  #3.  Vasculitis - per patient, she has a diagnosis of a vasculitis and is being treated with q6 month rituximab  - she follows with Dr. Okeefe at [facility]  #4 . dyslipidemia.  Continue rosuvastatin.  Somehow usually LDL cholesterol is less than 50 in this lab 80s 160 in this labs.  She states compliance with her medication.  We will recheck lipid panel.  If remains elevated and she is on statin therapy will have to increase rosuvastatin dose to 20 mg  Counseling regarding low saturated fat, salt and carbohydrate intake was reviewed. Active lifestyle and regular. Exercise along with weight management is advised. All the above were at length reviewed. Answered all the questions. Thank you very much for this kind referral. Please do not hesitate to give me a call for any question. Part of this transcription was done with voice recognition software and phonetically similar errors are common. I apologize for that. Please do not hesitate to call for any questions due to above.   Sincerely, Callie Lopez MD,FACC,AMANDA

## 2024-02-20 NOTE — CARDIOLOGY SUMMARY
[de-identified] : EKG 9/2020: SR, LBBB with  ms September 19, 2022.  Normal sinus rhythm February 20, 2024.  Sinus bradycardia nonspecific T wave changes [de-identified] : TTE 9/2020: LV 4.7 cm, LVEF 20-25% with global hypokinesis and dyskinetic septum, no significant valvular abnormalities, normal RV size/function, estimated RAP 5-8 mmHg August 16 2023.  LVEF 50 to 55%.  VICKEY 2019: LVEF 25-30%, mobile densities on tricuspid valve  May 10, 2022.  April 2022.  LVEF 40 to 45% modified Carroll 45% LVIDD 4.5 minimal mitral regurgitation normal left atrium RVSP 29.  Normal RV. [de-identified] : [facility] 2015: clean coronaries, LVEF 30% by ventriculogram

## 2024-05-01 ENCOUNTER — APPOINTMENT (OUTPATIENT)
Dept: CARDIOLOGY | Facility: CLINIC | Age: 59
End: 2024-05-01
Payer: MEDICARE

## 2024-05-01 ENCOUNTER — NON-APPOINTMENT (OUTPATIENT)
Age: 59
End: 2024-05-01

## 2024-05-01 PROCEDURE — 93296 REM INTERROG EVL PM/IDS: CPT

## 2024-05-01 PROCEDURE — 93295 DEV INTERROG REMOTE 1/2/MLT: CPT

## 2024-06-05 ENCOUNTER — TRANSCRIPTION ENCOUNTER (OUTPATIENT)
Age: 59
End: 2024-06-05

## 2024-06-05 RX ORDER — SPIRONOLACTONE 25 MG/1
25 TABLET ORAL DAILY
Qty: 90 | Refills: 3 | Status: ACTIVE | COMMUNITY
Start: 2021-02-24 | End: 1900-01-01

## 2024-07-11 ENCOUNTER — TRANSCRIPTION ENCOUNTER (OUTPATIENT)
Age: 59
End: 2024-07-11

## 2024-07-31 ENCOUNTER — APPOINTMENT (OUTPATIENT)
Dept: CARDIOLOGY | Facility: CLINIC | Age: 59
End: 2024-07-31
Payer: MEDICARE

## 2024-07-31 ENCOUNTER — NON-APPOINTMENT (OUTPATIENT)
Age: 59
End: 2024-07-31

## 2024-07-31 PROCEDURE — 93295 DEV INTERROG REMOTE 1/2/MLT: CPT

## 2024-07-31 PROCEDURE — 93296 REM INTERROG EVL PM/IDS: CPT

## 2024-08-21 ENCOUNTER — APPOINTMENT (OUTPATIENT)
Dept: CARDIOLOGY | Facility: CLINIC | Age: 59
End: 2024-08-21
Payer: MEDICARE

## 2024-08-21 VITALS
BODY MASS INDEX: 29.41 KG/M2 | HEART RATE: 66 BPM | OXYGEN SATURATION: 98 % | HEIGHT: 66 IN | SYSTOLIC BLOOD PRESSURE: 102 MMHG | WEIGHT: 183 LBS | DIASTOLIC BLOOD PRESSURE: 60 MMHG

## 2024-08-21 DIAGNOSIS — I10 ESSENTIAL (PRIMARY) HYPERTENSION: ICD-10-CM

## 2024-08-21 DIAGNOSIS — E78.5 HYPERLIPIDEMIA, UNSPECIFIED: ICD-10-CM

## 2024-08-21 DIAGNOSIS — Z95.810 PRESENCE OF AUTOMATIC (IMPLANTABLE) CARDIAC DEFIBRILLATOR: ICD-10-CM

## 2024-08-21 DIAGNOSIS — I42.8 OTHER CARDIOMYOPATHIES: ICD-10-CM

## 2024-08-21 DIAGNOSIS — I50.22 CHRONIC SYSTOLIC (CONGESTIVE) HEART FAILURE: ICD-10-CM

## 2024-08-21 DIAGNOSIS — I42.9 CARDIOMYOPATHY, UNSPECIFIED: ICD-10-CM

## 2024-08-21 PROCEDURE — 93000 ELECTROCARDIOGRAM COMPLETE: CPT

## 2024-08-21 PROCEDURE — G2211 COMPLEX E/M VISIT ADD ON: CPT

## 2024-08-21 PROCEDURE — 93282 PRGRMG EVAL IMPLANTABLE DFB: CPT

## 2024-08-21 PROCEDURE — 99214 OFFICE O/P EST MOD 30 MIN: CPT

## 2024-08-21 PROCEDURE — 93306 TTE W/DOPPLER COMPLETE: CPT

## 2024-08-21 NOTE — PROCEDURE
[No] : not [See Device Printout] : See device printout [ICD] : Implantable cardioverter-defibrillator [VVI] : VVI [Lead Imp:  ___ohms] : lead impedance was [unfilled] ohms [Sensing Amplitude ___mv] : sensing amplitude was [unfilled] mv [___V @] : [unfilled] V [___ ms] : [unfilled] ms [None] : none [Counters Reset] : the counters were reset [Pace ___ %] : Pace [unfilled]% [de-identified] : MANNY [de-identified] : Fortify Assura [de-identified] : [id number] [de-identified] : 2/13/2020 [de-identified] : 40 [de-identified] : 6.0 years [de-identified] : AICD with normal sensing, capture, battery  No events noted on today's interrogation Echo and office visit with Dr. Lopez today Of note, she underwent Cochlear implant which was unsuccessful d/t too much "fibrosis". Pt accompanied by her  at office visits.   Sincerely,  Madeleine Aviles PA-C Patients history, testing and plan reviewed with supervising MD: Dr. Callie Lopez

## 2024-08-21 NOTE — PROCEDURE
[No] : not [See Device Printout] : See device printout [ICD] : Implantable cardioverter-defibrillator [VVI] : VVI [Lead Imp:  ___ohms] : lead impedance was [unfilled] ohms [Sensing Amplitude ___mv] : sensing amplitude was [unfilled] mv [___V @] : [unfilled] V [___ ms] : [unfilled] ms [None] : none [Counters Reset] : the counters were reset [Pace ___ %] : Pace [unfilled]% [de-identified] : MANNY [de-identified] : Fortify Assura [de-identified] : [id number] [de-identified] : 2/13/2020 [de-identified] : 40 [de-identified] : 6.0 years [de-identified] : AICD with normal sensing, capture, battery  No events noted on today's interrogation Echo and office visit with Dr. Lopez today Of note, she underwent Cochlear implant which was unsuccessful d/t too much "fibrosis". Pt accompanied by her  at office visits.   Sincerely,  Madeleine Aviles PA-C Patients history, testing and plan reviewed with supervising MD: Dr. Callie Lopez

## 2024-08-21 NOTE — CARDIOLOGY SUMMARY
[de-identified] : EKG 9/2020: SR, LBBB with  ms September 19, 2022.  Normal sinus rhythm February 20, 2024.  Sinus bradycardia nonspecific T wave changes August 21, 2024.  Normal sinus rhythm nonspecific T wave changes normal intervals. [de-identified] : TTE 9/2020: LV 4.7 cm, LVEF 20-25% with global hypokinesis and dyskinetic septum, no significant valvular abnormalities, normal RV size/function, estimated RAP 5-8 mmHg August 16 2023.  LVEF 50 to 55%. August 21, 2024.  LVEF around 55% abnormal septal motion trace MR.  VICKEY 2019: LVEF 25-30%, mobile densities on tricuspid valve  May 10, 2022.  April 2022.  LVEF 40 to 45% modified Carroll 45% LVIDD 4.5 minimal mitral regurgitation normal left atrium RVSP 29.  Normal RV. [de-identified] : ICD check.  August 21, 2024 reviewed.  Normal functioning. [de-identified] : [facility] 2015: clean coronaries, LVEF 30% by ventriculogram

## 2024-08-21 NOTE — HISTORY OF PRESENT ILLNESS
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  Echocardiogram and labs were reviewed. She was diagnosed with heart failure in 2015 where she was found to have LVEF of approximately 30%, she was apparently asymptomatic at this time.  A coronary angiogram was negative for obstructive CAD and due to presence of a LBBB underwent implantation of a CRT-D. She was following with Dr. Bae.   She was admitted to [place] in 2019 for 4 months with multiorgan failure, DKA, and sepsis where she was found to have TV endocarditis. At this hospital there was concern for vasculitis for which she was treated with high dose steroids and rituximab. The ICD was extracted and she as treated with antibiotics before she was reimplanted with a single chamber ICD. Of note, during that prolonged admission she developed bilateral sensorineural hearing loss and has since been implanted with b/l cochlear implant. Incomplete insertion of electrode array noted as well as significant fibrosis during CI surgery. She has been following with audiology, in spite of making numerous changes to program her CI - unable to deliver stimulation to patient.   She is here today for clinical follow-up to review echocardiogram.  Reviewed blood pressure and heart rate.  She reports no overt heart failure symptoms, specifically denies orthopnea, PND, bendopnea, LE edema, chest discomfort, dizziness/lightheadedness, palpitations or syncope. Patient denies any recent ICD shocks. No recent hospitalizations or visits to the ED. she is using stationary bicycle.  She has stable weight. Diagnosed to have hypothyroidism now on Synthroid replacement.

## 2024-08-21 NOTE — CARDIOLOGY SUMMARY
[de-identified] : EKG 9/2020: SR, LBBB with  ms September 19, 2022.  Normal sinus rhythm February 20, 2024.  Sinus bradycardia nonspecific T wave changes August 21, 2024.  Normal sinus rhythm nonspecific T wave changes normal intervals. [de-identified] : TTE 9/2020: LV 4.7 cm, LVEF 20-25% with global hypokinesis and dyskinetic septum, no significant valvular abnormalities, normal RV size/function, estimated RAP 5-8 mmHg August 16 2023.  LVEF 50 to 55%. August 21, 2024.  LVEF around 55% abnormal septal motion trace MR.  VICKEY 2019: LVEF 25-30%, mobile densities on tricuspid valve  May 10, 2022.  April 2022.  LVEF 40 to 45% modified Carroll 45% LVIDD 4.5 minimal mitral regurgitation normal left atrium RVSP 29.  Normal RV. [de-identified] : ICD check.  August 21, 2024 reviewed.  Normal functioning. [de-identified] : [facility] 2015: clean coronaries, LVEF 30% by ventriculogram

## 2024-08-21 NOTE — PHYSICAL EXAM
[Normal] : well developed, well nourished, no acute distress [Normal Venous Pressure] : normal venous pressure [Normal S1, S2] : normal S1, S2 [Murmur] : murmur [Clear Lung Fields] : clear lung fields [No Edema] : no edema [Normal Radial B/L] : normal radial B/L [Normal Speech] : normal speech [Alert and Oriented] : alert and oriented

## 2024-08-21 NOTE — ASSESSMENT
[FreeTextEntry1] : Reviewed on October 4, 2021. CBC CMP September 22, 2021 reviewed.  BNP level 74. Reviewed on May 10, 2022.  Labs from March and February were reviewed.  CMP stable.  LDL 65 HDL 47 triglycerides 75 total cholesterol 127 hemoglobin A1c 6.3 Echocardiogram April 8, 2022 reviewed  Reviewed on September 19, 2022 EKG as noted above Recent labs.  August 2022.  Creatinine 1.0 potassium 4.8 sodium 143 LFT normal hemoglobin 11.1 platelet count 170 hemoglobin A1c 6.4 LDL 53 HDL 61 triglycerides 82  Reviewed on May 17, 2023. Labs from May 1, 2023 reviewed which showed creatinine 1.09 potassium 4.7 sodium 140 LFT normal N-terminal proBNP 77  Reviewed on August 16, 2023. Echocardiogram from today reviewed.  Reviewed on February 20, 2024.  EKG reviewed.  Most recent labs February 5, 2024 CMP stable  HDL 56 N-terminal proBNP 121. AICD check January 2024 reviewed.  August 21, 2024. Reviewed on echocardiogram from today reviewed. EKG from today reviewed. Labs from June 12, 2024 reviewed showing stable CBC CMP.  Creatinine 1.2 potassium 4.6 sodium 141 LFT normal.  LDL cholesterol of 64 HDL 61 triglycerides 102.  Hemoglobin A1c very well-controlled TSH normal.

## 2024-08-21 NOTE — DISCUSSION/SUMMARY
[Patient] : the patient [Risks] : risks [Benefits] : benefits [Alternatives] : alternatives [FreeTextEntry1] : 58 female with above medical history active medical problems as noted below  #1 . chronic systolic heart failure/ chronic HFrecEF ACC/AHA stage C, NYHA I - Etiology: unclear etiology. NICMP LVEF 45% LVIDD 4.5.  Now LV ejection fraction improved to 55% with normal LVIDD.  Class I-II.  Stage C.  Stable N-terminal proBNP - GDMT: MEtoprolol succ 50mg daily, Entresto 49-51 mg BID, spironolactone 25 mg daily. farxiga  10 mg once daily. No further episodes of DKA or the diabetic complications. - Diuretic: euvolemic off diuretics - Unable to participate in cardiac rehab due to instability/balance abnormality -Labs ordered.  # 2. LBBB with prior CRT, now explanted for infection and reimplanted with SC-ICD at present sinus bradycardia. -   Recent interrogation reviewed., no events with V pacing < 1%  #3.  Vasculitis - per patient, she has a diagnosis of a vasculitis and is being treated with q6 month rituximab  - she follows with Dr. Okeefe at [facility]  #4 . dyslipidemia.  Continue rosuvastatin.LDL cholesterolIs under 70.    She states compliance with her medication.    Counseling regarding low saturated fat, salt and carbohydrate intake was reviewed. Active lifestyle and regular. Exercise along with weight management is advised. All the above were at length reviewed. Answered all the questions. Thank you very much for this kind referral. Please do not hesitate to give me a call for any question. Part of this transcription was done with voice recognition software and phonetically similar errors are common. I apologize for that. Please do not hesitate to call for any questions due to above.   Sincerely, Callie Lopez MD,FACC,AMANDA  [EKG obtained to assist in diagnosis and management of assessed problem(s)] : EKG obtained to assist in diagnosis and management of assessed problem(s)

## 2024-08-21 NOTE — HISTORY OF PRESENT ILLNESS
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  Echocardiogram and labs were reviewed. She was diagnosed with heart failure in 2015 where she was found to have LVEF of approximately 30%, she was apparently asymptomatic at this time.  A coronary angiogram was negative for obstructive CAD and due to presence of a LBBB underwent implantation of a CRT-D. She was following with Dr. Bae.   She was admitted to [place] in 2019 for 4 months with multiorgan failure, DKA, and sepsis where she was found to have TV endocarditis. At this hospital there was concern for vasculitis for which she was treated with high dose steroids and rituximab. The ICD was extracted and she as treated with antibiotics before she was reimplanted with a single chamber ICD. Of note, during that prolonged admission she developed bilateral sensorineural hearing loss and has since been implanted with b/l cochlear implant. Incomplete insertion of electrode array noted as well as significant fibrosis during CI surgery. She has been following with audiology, in spite of making numerous changes to program her CI - unable to deliver stimulation to patient.   She is here today for clinical follow-up to review echocardiogram.  Reviewed blood pressure and heart rate.  She reports no overt heart failure symptoms, specifically denies orthopnea, PND, bendopnea, LE edema, chest discomfort, dizziness/lightheadedness, palpitations or syncope. Patient denies any recent ICD shocks. No recent hospitalizations or visits to the ED. she is using stationary bicycle.  She has stable weight. Diagnosed to have hypothyroidism now on Synthroid replacement.

## 2024-08-21 NOTE — DISCUSSION/SUMMARY
[Patient] : the patient [Risks] : risks [Benefits] : benefits [Alternatives] : alternatives [FreeTextEntry1] : 58 female with above medical history active medical problems as noted below  #1 . chronic systolic heart failure/ chronic HFrecEF ACC/AHA stage C, NYHA I - Etiology: unclear etiology. NICMP LVEF 45% LVIDD 4.5.  Now LV ejection fraction improved to 55% with normal LVIDD.  Class I-II.  Stage C.  Stable N-terminal proBNP - GDMT: MEtoprolol succ 50mg daily, Entresto 49-51 mg BID, spironolactone 25 mg daily. farxiga  10 mg once daily. No further episodes of DKA or the diabetic complications. - Diuretic: euvolemic off diuretics - Unable to participate in cardiac rehab due to instability/balance abnormality -Labs ordered.  # 2. LBBB with prior CRT, now explanted for infection and reimplanted with SC-ICD at present sinus bradycardia. -   Recent interrogation reviewed., no events with V pacing < 1%  #3.  Vasculitis - per patient, she has a diagnosis of a vasculitis and is being treated with q6 month rituximab  - she follows with Dr. Okeefe at [facility]  #4 . dyslipidemia.  Continue rosuvastatin.LDL cholesterolIs under 70.    She states compliance with her medication.    Counseling regarding low saturated fat, salt and carbohydrate intake was reviewed. Active lifestyle and regular. Exercise along with weight management is advised. All the above were at length reviewed. Answered all the questions. Thank you very much for this kind referral. Please do not hesitate to give me a call for any question. Part of this transcription was done with voice recognition software and phonetically similar errors are common. I apologize for that. Please do not hesitate to call for any questions due to above.   Sincerely, Callie Lopez MD,FACC,AMANDA  [EKG obtained to assist in diagnosis and management of assessed problem(s)] : EKG obtained to assist in diagnosis and management of assessed problem(s)

## 2024-09-04 ENCOUNTER — TRANSCRIPTION ENCOUNTER (OUTPATIENT)
Age: 59
End: 2024-09-04

## 2024-10-21 ENCOUNTER — TRANSCRIPTION ENCOUNTER (OUTPATIENT)
Age: 59
End: 2024-10-21

## 2024-10-29 ENCOUNTER — NON-APPOINTMENT (OUTPATIENT)
Age: 59
End: 2024-10-29

## 2024-10-30 ENCOUNTER — NON-APPOINTMENT (OUTPATIENT)
Age: 59
End: 2024-10-30

## 2024-10-30 ENCOUNTER — APPOINTMENT (OUTPATIENT)
Dept: CARDIOLOGY | Facility: CLINIC | Age: 59
End: 2024-10-30
Payer: MEDICARE

## 2024-10-30 PROCEDURE — 93295 DEV INTERROG REMOTE 1/2/MLT: CPT

## 2024-10-30 PROCEDURE — 93296 REM INTERROG EVL PM/IDS: CPT

## 2024-10-30 NOTE — REVIEW OF SYSTEMS
[As Noted in HPI] : as noted in HPI [Negative] : Heme/Lymph Urine Pregnancy Test Result: negative Lot # (Optional): [id number] Performed By: [email or web address] Expiration Date (Optional): 2025-05-10 Detail Level: None

## 2024-12-25 PROBLEM — F10.90 ALCOHOL USE: Status: ACTIVE | Noted: 2017-12-28

## 2025-01-10 ENCOUNTER — TRANSCRIPTION ENCOUNTER (OUTPATIENT)
Age: 60
End: 2025-01-10

## 2025-01-24 ENCOUNTER — NON-APPOINTMENT (OUTPATIENT)
Age: 60
End: 2025-01-24

## 2025-01-29 ENCOUNTER — APPOINTMENT (OUTPATIENT)
Dept: CARDIOLOGY | Facility: CLINIC | Age: 60
End: 2025-01-29
Payer: MEDICARE

## 2025-01-29 ENCOUNTER — NON-APPOINTMENT (OUTPATIENT)
Age: 60
End: 2025-01-29

## 2025-01-29 PROCEDURE — 93296 REM INTERROG EVL PM/IDS: CPT

## 2025-01-29 PROCEDURE — 93295 DEV INTERROG REMOTE 1/2/MLT: CPT

## 2025-02-19 ENCOUNTER — NON-APPOINTMENT (OUTPATIENT)
Age: 60
End: 2025-02-19

## 2025-02-19 ENCOUNTER — APPOINTMENT (OUTPATIENT)
Dept: CARDIOLOGY | Facility: CLINIC | Age: 60
End: 2025-02-19
Payer: MEDICARE

## 2025-02-19 VITALS
OXYGEN SATURATION: 99 % | HEART RATE: 69 BPM | SYSTOLIC BLOOD PRESSURE: 112 MMHG | DIASTOLIC BLOOD PRESSURE: 60 MMHG | BODY MASS INDEX: 29.57 KG/M2 | WEIGHT: 184 LBS | HEIGHT: 66 IN

## 2025-02-19 DIAGNOSIS — I50.22 CHRONIC SYSTOLIC (CONGESTIVE) HEART FAILURE: ICD-10-CM

## 2025-02-19 DIAGNOSIS — Z95.810 PRESENCE OF AUTOMATIC (IMPLANTABLE) CARDIAC DEFIBRILLATOR: ICD-10-CM

## 2025-02-19 DIAGNOSIS — E78.5 HYPERLIPIDEMIA, UNSPECIFIED: ICD-10-CM

## 2025-02-19 DIAGNOSIS — I42.8 OTHER CARDIOMYOPATHIES: ICD-10-CM

## 2025-02-19 DIAGNOSIS — I10 ESSENTIAL (PRIMARY) HYPERTENSION: ICD-10-CM

## 2025-02-19 PROCEDURE — G2211 COMPLEX E/M VISIT ADD ON: CPT

## 2025-02-19 PROCEDURE — 99214 OFFICE O/P EST MOD 30 MIN: CPT

## 2025-02-19 RX ORDER — LINAGLIPTIN 5 MG/1
5 TABLET, FILM COATED ORAL DAILY
Refills: 0 | Status: ACTIVE | COMMUNITY

## 2025-02-19 RX ORDER — INSULIN GLARGINE 100 [IU]/ML
100 INJECTION, SOLUTION SUBCUTANEOUS
Refills: 0 | Status: ACTIVE | COMMUNITY

## 2025-04-14 ENCOUNTER — TRANSCRIPTION ENCOUNTER (OUTPATIENT)
Age: 60
End: 2025-04-14

## 2025-04-30 ENCOUNTER — APPOINTMENT (OUTPATIENT)
Dept: CARDIOLOGY | Facility: CLINIC | Age: 60
End: 2025-04-30
Payer: MEDICARE

## 2025-04-30 ENCOUNTER — NON-APPOINTMENT (OUTPATIENT)
Age: 60
End: 2025-04-30

## 2025-04-30 PROCEDURE — 93295 DEV INTERROG REMOTE 1/2/MLT: CPT

## 2025-04-30 PROCEDURE — 93296 REM INTERROG EVL PM/IDS: CPT

## 2025-06-02 ENCOUNTER — TRANSCRIPTION ENCOUNTER (OUTPATIENT)
Age: 60
End: 2025-06-02

## 2025-07-30 ENCOUNTER — APPOINTMENT (OUTPATIENT)
Dept: CARDIOLOGY | Facility: CLINIC | Age: 60
End: 2025-07-30
Payer: MEDICARE

## 2025-07-30 ENCOUNTER — NON-APPOINTMENT (OUTPATIENT)
Age: 60
End: 2025-07-30

## 2025-07-30 PROCEDURE — 93296 REM INTERROG EVL PM/IDS: CPT

## 2025-07-30 PROCEDURE — 93295 DEV INTERROG REMOTE 1/2/MLT: CPT

## 2025-08-20 ENCOUNTER — APPOINTMENT (OUTPATIENT)
Dept: CARDIOLOGY | Facility: CLINIC | Age: 60
End: 2025-08-20
Payer: MEDICARE

## 2025-08-20 VITALS
OXYGEN SATURATION: 98 % | SYSTOLIC BLOOD PRESSURE: 108 MMHG | BODY MASS INDEX: 30.05 KG/M2 | DIASTOLIC BLOOD PRESSURE: 60 MMHG | HEART RATE: 65 BPM | HEIGHT: 66 IN | WEIGHT: 187 LBS

## 2025-08-20 DIAGNOSIS — I50.22 CHRONIC SYSTOLIC (CONGESTIVE) HEART FAILURE: ICD-10-CM

## 2025-08-20 DIAGNOSIS — I42.8 OTHER CARDIOMYOPATHIES: ICD-10-CM

## 2025-08-20 DIAGNOSIS — I10 ESSENTIAL (PRIMARY) HYPERTENSION: ICD-10-CM

## 2025-08-20 DIAGNOSIS — Z95.810 PRESENCE OF AUTOMATIC (IMPLANTABLE) CARDIAC DEFIBRILLATOR: ICD-10-CM

## 2025-08-20 DIAGNOSIS — E78.5 HYPERLIPIDEMIA, UNSPECIFIED: ICD-10-CM

## 2025-08-20 PROCEDURE — 93306 TTE W/DOPPLER COMPLETE: CPT

## 2025-08-20 PROCEDURE — 99214 OFFICE O/P EST MOD 30 MIN: CPT

## 2025-08-20 PROCEDURE — 93000 ELECTROCARDIOGRAM COMPLETE: CPT

## 2025-08-20 PROCEDURE — G2211 COMPLEX E/M VISIT ADD ON: CPT
